# Patient Record
Sex: FEMALE | Race: WHITE
[De-identification: names, ages, dates, MRNs, and addresses within clinical notes are randomized per-mention and may not be internally consistent; named-entity substitution may affect disease eponyms.]

---

## 2017-01-03 ENCOUNTER — HOSPITAL ENCOUNTER (INPATIENT)
Dept: HOSPITAL 47 - EC | Age: 62
LOS: 3 days | Discharge: HOME | DRG: 287 | End: 2017-01-06
Payer: COMMERCIAL

## 2017-01-03 VITALS — BODY MASS INDEX: 38.7 KG/M2

## 2017-01-03 DIAGNOSIS — M19.90: ICD-10-CM

## 2017-01-03 DIAGNOSIS — E66.9: ICD-10-CM

## 2017-01-03 DIAGNOSIS — E78.00: ICD-10-CM

## 2017-01-03 DIAGNOSIS — Z96.612: ICD-10-CM

## 2017-01-03 DIAGNOSIS — E78.5: ICD-10-CM

## 2017-01-03 DIAGNOSIS — Z79.899: ICD-10-CM

## 2017-01-03 DIAGNOSIS — Z87.891: ICD-10-CM

## 2017-01-03 DIAGNOSIS — E03.9: ICD-10-CM

## 2017-01-03 DIAGNOSIS — Z96.611: ICD-10-CM

## 2017-01-03 DIAGNOSIS — I11.0: ICD-10-CM

## 2017-01-03 DIAGNOSIS — F32.9: ICD-10-CM

## 2017-01-03 DIAGNOSIS — I50.22: ICD-10-CM

## 2017-01-03 DIAGNOSIS — M79.7: ICD-10-CM

## 2017-01-03 DIAGNOSIS — I20.1: ICD-10-CM

## 2017-01-03 DIAGNOSIS — I25.110: Primary | ICD-10-CM

## 2017-01-03 DIAGNOSIS — Z96.653: ICD-10-CM

## 2017-01-03 DIAGNOSIS — Z88.5: ICD-10-CM

## 2017-01-03 DIAGNOSIS — Z85.820: ICD-10-CM

## 2017-01-03 DIAGNOSIS — I42.9: ICD-10-CM

## 2017-01-03 DIAGNOSIS — I49.3: ICD-10-CM

## 2017-01-03 LAB
ALP SERPL-CCNC: 96 U/L (ref 38–126)
ALT SERPL-CCNC: 29 U/L (ref 9–52)
ANION GAP SERPL CALC-SCNC: 16 MMOL/L
APTT BLD: 24.3 SEC (ref 22–30)
AST SERPL-CCNC: 26 U/L (ref 14–36)
BASOPHILS # BLD AUTO: 0 K/UL (ref 0–0.2)
BASOPHILS NFR BLD AUTO: 1 %
BUN SERPL-SCNC: 15 MG/DL (ref 7–17)
CALCIUM SPEC-MCNC: 9.6 MG/DL (ref 8.4–10.2)
CH: 29.3
CHCM: 33.9
CHLORIDE SERPL-SCNC: 105 MMOL/L (ref 98–107)
CK SERPL-CCNC: 42 U/L (ref 30–135)
CK SERPL-CCNC: 46 U/L (ref 30–135)
CO2 SERPL-SCNC: 23 MMOL/L (ref 22–30)
EOSINOPHIL # BLD AUTO: 0.1 K/UL (ref 0–0.7)
EOSINOPHIL NFR BLD AUTO: 2 %
ERYTHROCYTE [DISTWIDTH] IN BLOOD BY AUTOMATED COUNT: 5.16 M/UL (ref 3.8–5.4)
ERYTHROCYTE [DISTWIDTH] IN BLOOD: 12.8 % (ref 11.5–15.5)
GLUCOSE SERPL-MCNC: 107 MG/DL (ref 74–99)
HCT VFR BLD AUTO: 44.7 % (ref 34–46)
HDW: 2.26
HGB BLD-MCNC: 14.8 GM/DL (ref 11.4–16)
INR PPP: 1 (ref ?–1.1)
LUC NFR BLD AUTO: 1 %
LYMPHOCYTES # SPEC AUTO: 1.3 K/UL (ref 1–4.8)
LYMPHOCYTES NFR SPEC AUTO: 21 %
MAGNESIUM SPEC-SCNC: 1.8 MG/DL (ref 1.6–2.3)
MCH RBC QN AUTO: 28.6 PG (ref 25–35)
MCHC RBC AUTO-ENTMCNC: 33 G/DL (ref 31–37)
MCV RBC AUTO: 86.7 FL (ref 80–100)
MONOCYTES # BLD AUTO: 0.5 K/UL (ref 0–1)
MONOCYTES NFR BLD AUTO: 8 %
NEUTROPHILS # BLD AUTO: 4.3 K/UL (ref 1.3–7.7)
NEUTROPHILS NFR BLD AUTO: 67 %
NON-AFRICAN AMERICAN GFR(MDRD): >60
POTASSIUM SERPL-SCNC: 4.5 MMOL/L (ref 3.5–5.1)
PROT SERPL-MCNC: 7.7 G/DL (ref 6.3–8.2)
PT BLD: 10.5 SEC (ref 9–12)
SODIUM SERPL-SCNC: 144 MMOL/L (ref 137–145)
TROPONIN I SERPL-MCNC: <0.012 NG/ML (ref 0–0.03)
TROPONIN I SERPL-MCNC: <0.012 NG/ML (ref 0–0.03)
WBC # BLD AUTO: 0.08 10*3/UL
WBC # BLD AUTO: 6.4 K/UL (ref 3.8–10.6)
WBC (PEROX): 6.18

## 2017-01-03 PROCEDURE — 96365 THER/PROPH/DIAG IV INF INIT: CPT

## 2017-01-03 PROCEDURE — 93017 CV STRESS TEST TRACING ONLY: CPT

## 2017-01-03 PROCEDURE — 78452 HT MUSCLE IMAGE SPECT MULT: CPT

## 2017-01-03 PROCEDURE — 82553 CREATINE MB FRACTION: CPT

## 2017-01-03 PROCEDURE — 36415 COLL VENOUS BLD VENIPUNCTURE: CPT

## 2017-01-03 PROCEDURE — 80061 LIPID PANEL: CPT

## 2017-01-03 PROCEDURE — 85610 PROTHROMBIN TIME: CPT

## 2017-01-03 PROCEDURE — 80048 BASIC METABOLIC PNL TOTAL CA: CPT

## 2017-01-03 PROCEDURE — 82550 ASSAY OF CK (CPK): CPT

## 2017-01-03 PROCEDURE — 80053 COMPREHEN METABOLIC PANEL: CPT

## 2017-01-03 PROCEDURE — 99291 CRITICAL CARE FIRST HOUR: CPT

## 2017-01-03 PROCEDURE — 93306 TTE W/DOPPLER COMPLETE: CPT

## 2017-01-03 PROCEDURE — 96361 HYDRATE IV INFUSION ADD-ON: CPT

## 2017-01-03 PROCEDURE — 93005 ELECTROCARDIOGRAM TRACING: CPT

## 2017-01-03 PROCEDURE — 83735 ASSAY OF MAGNESIUM: CPT

## 2017-01-03 PROCEDURE — 93458 L HRT ARTERY/VENTRICLE ANGIO: CPT

## 2017-01-03 PROCEDURE — 71020: CPT

## 2017-01-03 PROCEDURE — 85730 THROMBOPLASTIN TIME PARTIAL: CPT

## 2017-01-03 PROCEDURE — 85025 COMPLETE CBC W/AUTO DIFF WBC: CPT

## 2017-01-03 PROCEDURE — 84484 ASSAY OF TROPONIN QUANT: CPT

## 2017-01-03 PROCEDURE — 96376 TX/PRO/DX INJ SAME DRUG ADON: CPT

## 2017-01-03 RX ADMIN — NITROGLYCERIN PRN MG: 0.4 TABLET SUBLINGUAL at 20:52

## 2017-01-03 RX ADMIN — NITROGLYCERIN PRN MG: 0.4 TABLET SUBLINGUAL at 20:41

## 2017-01-03 RX ADMIN — CYCLOBENZAPRINE HYDROCHLORIDE PRN MG: 5 TABLET, FILM COATED ORAL at 23:34

## 2017-01-03 RX ADMIN — NITROGLYCERIN SCH: 20 OINTMENT TOPICAL at 23:32

## 2017-01-03 RX ADMIN — HYDROCODONE BITARTRATE AND ACETAMINOPHEN PRN EACH: 10; 325 TABLET ORAL at 22:53

## 2017-01-03 RX ADMIN — NITROGLYCERIN PRN MG: 0.4 TABLET SUBLINGUAL at 20:46

## 2017-01-03 NOTE — XR
EXAMINATION TYPE: XR chest 2V

 

DATE OF EXAM: 1/3/2017 4:30 PM

 

COMPARISON: NONE

 

INDICATION: Chest pain

 

TECHNIQUE:  Frontal and lateral views of the chest are obtained.

 

FINDINGS:  

The heart size is normal.

The pulmonary vasculature is normal.

The lungs are clear.

 

IMPRESSION:  

1. No acute pulmonary process.

## 2017-01-03 NOTE — ED
General Adult HPI





- General


Chief complaint: Chest Pain


Stated complaint: Chest Pressure


Time Seen by Provider: 01/03/17 14:50


Source: patient, RN notes reviewed


Mode of arrival: wheelchair


Limitations: physical limitation





- History of Present Illness


Initial comments: 





This is a 61-year-old female with a past medical history significant for 

possible syndrome and high blood pressure.  Patient states she has borderline 

high cholesterol.  Patient comes in today because she 6.  One hour of chest 

tightness.  Patient states became very diaphoretic.  In the tightness 

continues.  Patient states she's mildly short of breath.  Patient denies any 

nausea.  Patient denies any palpitations.  Patient denies lightheadedness 

dizziness or near syncopal episode per patient denies headache patient denies 

numbness weakness.  Patient denies abdominal pain patient denies nausea 

vomiting or diarrhea.  Patient states she was catheterized about 15 years ago 

but has not had any Physician since.





- Related Data


 Home Medications











 Medication  Instructions  Recorded  Confirmed


 


ALPRAZolam [Xanax] 0.25 mg PO BID PRN 05/02/16 01/03/17


 


Albuterol Inhaler [Ventolin Hfa 2 puff INHALATION RT-Q6H PRN 05/02/16 01/03/17





Inhaler]   


 


Cyclobenzaprine [Flexeril] 5 mg PO TID PRN 05/02/16 01/03/17


 


Levothyroxine Sodium [Synthroid] 88 mcg PO DAILY 05/02/16 01/03/17


 


Nabumetone [Relafen] 1,500 mg PO QAM 05/02/16 01/03/17


 


Omeprazole [PriLOSEC] 40 mg PO DAILY 05/02/16 01/03/17


 


Liothyronine Sodium [Cytomel] 10 mcg PO DAILY 08/31/16 01/03/17


 


Multivitamins, Thera [Multivitamin] 1 tab PO DAILY 08/31/16 01/03/17


 


Sertraline [Zoloft] 150 mg PO DAILY 08/31/16 01/03/17


 


Rizatriptan Odt [Maxalt Mlt] 10 mg PO DAILY PRN 09/12/16 01/03/17


 


Lisinopril [Zestril] 10 mg PO DAILY 12/14/16 01/03/17


 


Milk Thistle 150 mg PO DAILY 12/14/16 01/03/17


 


HYDROcodone/APAP 10-325MG [Norco 1 - 2 tab PO Q6H PRN 01/03/17 01/03/17





10]   











 Allergies











Allergy/AdvReac Type Severity Reaction Status Date / Time


 


codeine Allergy  Nausea & Verified 01/03/17 15:38





   Vomiting  














Review of Systems


ROS Statement: 


Those systems with pertinent positive or pertinent negative responses have been 

documented in the HPI.





ROS Other: All systems not noted in ROS Statement are negative.





Past Medical History


Past Medical History: Cancer, Hyperlipidemia, Hypertension, Osteoarthritis (OA)

, Thyroid Disorder


Additional Past Medical History / Comment(s): POTS,MTHFR mutation, hx melanoma


History of Any Multi-Drug Resistant Organisms: None Reported


Past Surgical History: Heart Catheterization, Hysterectomy, Joint Replacement, 

Orthopedic Surgery


Additional Past Surgical History / Comment(s): bilateral knee replacement, and 

bilateral shoulders, joint rt thumb replaced and revised


Past Anesthesia/Blood Transfusion Reactions: Previous Problems w/ Anesthesia, 

Postoperative Nausea & Vomiting (PONV)


Additional Past Anesthesia/Blood Transfusion Reaction / Comment(s): POTS 

episode during anesthesia (severe drop in BP after previous knee replacement 

surgery)


Past Psychological History: No Psychological Hx Reported


Smoking Status: Former smoker


Past Alcohol Use History: Occasional


Additional Past Alcohol Use History / Comment(s): quit smoking couple years 

ago. smoked for 15 days < 1/2 PPD


Past Drug Use History: None Reported





- Past Family History


  ** Sister(s)


Family Medical History: Cancer





  ** Brother(s)


Family Medical History: Cancer





General Exam





- General Exam Comments


Initial Comments: 





GENERAL:


Patient is well-developed and well-nourished.  Patient is nontoxic and well-

hydrated and is in mild distress.





ENT:


Neck is soft and supple.  No significant lymphadenopathy is noted.  Oropharynx 

is clear.  Moist mucous membranes.  Neck has full range of motion without 

eliciting any pain.  





EYES:


The sclera were anicteric and conjunctiva were pink and moist.  Extraocular 

movements were intact and pupils were equal round and reactive to light.  

Eyelids were unremarkable.





PULMONARY:


Unlabored respirations.  Good breath sounds bilaterally.  No audible rales 

rhonchi or wheezing was noted.





CARDIOVASCULAR:


There is a regular rate and rhythm without any murmurs gallops or rubs. 





ABDOMEN:


Soft and nontender with normal bowel sounds.  No palpable organomegaly was 

noted.  There is no palpable pulsatile mass.





SKIN:


Skin is clear with no lesions or rashes and otherwise unremarkable.





NEUROLOGIC:


Patient is alert and oriented x3.  Cranial nerves II through XII are grossly 

intact.  Motor and sensory are also intact.  Normal speech, volume and content.

  Symmetrical smile.  





MUSCULOSKELETAL:


Normal extremities with adequate strength and full range of motion.  





LYMPHATICS:


No significant lymphadenopathy is noted





PSYCHIATRIC:


Normal psychiatric evaluation.  Normal interpersonal interactions appears 

functionally intact in deals appropriately with others.  No signs of 

depression.  No signs of anxiety. 


Limitations: physical limitation





Course


 Vital Signs











  01/03/17 01/03/17





  15:02 16:28


 


Temperature 96.9 F L 


 


Pulse Rate 96 99


 


Respiratory 18 18





Rate  


 


Blood Pressure 150/94 126/83


 


O2 Sat by Pulse 100 97





Oximetry  














Medical Decision Making





- Medical Decision Making





Patient's EKG shows bigeminy.  EKG shows sinus rhythm at 102 bpm.  It was 162 

QRS is 94 QT interval 362 QTC is 471.  Intrinsic rhythm was compared to an old 

EKG no new EKG changes are noted aside from the bigeminy





Chest x-ray shows no acute abnormality.  I went back and reevaluated the 

patient she was no longer in bigeminy and she was feeling considerably better 

because of patient's initial symptoms.  I started the patient on heparin 

because of his symptoms I spoke with Dr. Shelby admitted patient's to Dr. Shelby and I wrote admitting orders and consult cardiology.





- Lab Data


Result diagrams: 


 01/03/17 15:15





 01/03/17 15:15


 Lab Results











  01/03/17 01/03/17 01/03/17 Range/Units





  15:15 15:15 15:15 


 


WBC   6.4   (3.8-10.6)  k/uL


 


RBC   5.16   (3.80-5.40)  m/uL


 


Hgb   14.8   (11.4-16.0)  gm/dL


 


Hct   44.7   (34.0-46.0)  %


 


MCV   86.7   (80.0-100.0)  fL


 


MCH   28.6   (25.0-35.0)  pg


 


MCHC   33.0   (31.0-37.0)  g/dL


 


RDW   12.8   (11.5-15.5)  %


 


Plt Count   332   (150-450)  k/uL


 


Neutrophils %   67   %


 


Lymphocytes %   21   %


 


Monocytes %   8   %


 


Eosinophils %   2   %


 


Basophils %   1   %


 


Neutrophils #   4.3   (1.3-7.7)  k/uL


 


Lymphocytes #   1.3   (1.0-4.8)  k/uL


 


Monocytes #   0.5   (0-1.0)  k/uL


 


Eosinophils #   0.1   (0-0.7)  k/uL


 


Basophils #   0.0   (0-0.2)  k/uL


 


PT     (9.0-12.0)  sec


 


INR     (<1.1)  


 


APTT     (22.0-30.0)  sec


 


Sodium    144  (137-145)  mmol/L


 


Potassium    4.5  (3.5-5.1)  mmol/L


 


Chloride    105  ()  mmol/L


 


Carbon Dioxide    23  (22-30)  mmol/L


 


Anion Gap    16  mmol/L


 


BUN    15  (7-17)  mg/dL


 


Creatinine    0.79  (0.52-1.04)  mg/dL


 


Est GFR (MDRD) Af Amer    >60  (>60 ml/min/1.73 sqM)  


 


Est GFR (MDRD) Non-Af    >60  (>60 ml/min/1.73 sqM)  


 


Glucose    107 H  (74-99)  mg/dL


 


Calcium    9.6  (8.4-10.2)  mg/dL


 


Magnesium    1.8  (1.6-2.3)  mg/dL


 


Total Bilirubin    0.7  (0.2-1.3)  mg/dL


 


AST    26  (14-36)  U/L


 


ALT    29  (9-52)  U/L


 


Alkaline Phosphatase    96  ()  U/L


 


Total Creatine Kinase  46    ()  U/L


 


CK-MB (CK-2)  0.5    (0.0-2.4)  ng/mL


 


CK-MB (CK-2) Rel Index  1.1    


 


Troponin I  <0.012    (0.000-0.034)  ng/mL


 


Total Protein    7.7  (6.3-8.2)  g/dL


 


Albumin    4.7  (3.5-5.0)  g/dL














  01/03/17 Range/Units





  15:15 


 


WBC   (3.8-10.6)  k/uL


 


RBC   (3.80-5.40)  m/uL


 


Hgb   (11.4-16.0)  gm/dL


 


Hct   (34.0-46.0)  %


 


MCV   (80.0-100.0)  fL


 


MCH   (25.0-35.0)  pg


 


MCHC   (31.0-37.0)  g/dL


 


RDW   (11.5-15.5)  %


 


Plt Count   (150-450)  k/uL


 


Neutrophils %   %


 


Lymphocytes %   %


 


Monocytes %   %


 


Eosinophils %   %


 


Basophils %   %


 


Neutrophils #   (1.3-7.7)  k/uL


 


Lymphocytes #   (1.0-4.8)  k/uL


 


Monocytes #   (0-1.0)  k/uL


 


Eosinophils #   (0-0.7)  k/uL


 


Basophils #   (0-0.2)  k/uL


 


PT  10.5  (9.0-12.0)  sec


 


INR  1.0  (<1.1)  


 


APTT  24.3  (22.0-30.0)  sec


 


Sodium   (137-145)  mmol/L


 


Potassium   (3.5-5.1)  mmol/L


 


Chloride   ()  mmol/L


 


Carbon Dioxide   (22-30)  mmol/L


 


Anion Gap   mmol/L


 


BUN   (7-17)  mg/dL


 


Creatinine   (0.52-1.04)  mg/dL


 


Est GFR (MDRD) Af Amer   (>60 ml/min/1.73 sqM)  


 


Est GFR (MDRD) Non-Af   (>60 ml/min/1.73 sqM)  


 


Glucose   (74-99)  mg/dL


 


Calcium   (8.4-10.2)  mg/dL


 


Magnesium   (1.6-2.3)  mg/dL


 


Total Bilirubin   (0.2-1.3)  mg/dL


 


AST   (14-36)  U/L


 


ALT   (9-52)  U/L


 


Alkaline Phosphatase   ()  U/L


 


Total Creatine Kinase   ()  U/L


 


CK-MB (CK-2)   (0.0-2.4)  ng/mL


 


CK-MB (CK-2) Rel Index   


 


Troponin I   (0.000-0.034)  ng/mL


 


Total Protein   (6.3-8.2)  g/dL


 


Albumin   (3.5-5.0)  g/dL














Critical Care Time


Critical Care Time: Yes


Total Critical Care Time: 35





Disposition


Clinical Impression: 


 Unstable angina pectoris, Bigeminy





Disposition: ADMITTED AS IP TO THIS [facility]


Time of Disposition: 16:56

## 2017-01-04 LAB
CHOLEST SERPL-MCNC: 228 MG/DL (ref ?–200)
CK SERPL-CCNC: 40 U/L (ref 30–135)
HDLC SERPL-MCNC: 49 MG/DL (ref 40–60)
TRIGL SERPL-MCNC: 90 MG/DL (ref ?–150)
TROPONIN I SERPL-MCNC: <0.012 NG/ML (ref 0–0.03)

## 2017-01-04 RX ADMIN — THERA TABS SCH EACH: TAB at 08:16

## 2017-01-04 RX ADMIN — LEVOTHYROXINE SODIUM SCH MCG: 75 TABLET ORAL at 07:53

## 2017-01-04 RX ADMIN — CYCLOBENZAPRINE HYDROCHLORIDE PRN MG: 5 TABLET, FILM COATED ORAL at 20:55

## 2017-01-04 RX ADMIN — SERTRALINE HYDROCHLORIDE SCH MG: 100 TABLET ORAL at 08:15

## 2017-01-04 RX ADMIN — ASPIRIN 325 MG ORAL TABLET SCH MG: 325 PILL ORAL at 08:15

## 2017-01-04 RX ADMIN — LISINOPRIL SCH MG: 10 TABLET ORAL at 08:15

## 2017-01-04 RX ADMIN — HYDROCODONE BITARTRATE AND ACETAMINOPHEN PRN EACH: 10; 325 TABLET ORAL at 20:14

## 2017-01-04 RX ADMIN — PANTOPRAZOLE SODIUM SCH MG: 40 TABLET, DELAYED RELEASE ORAL at 07:53

## 2017-01-04 RX ADMIN — LIOTHYRONINE SODIUM SCH MCG: 5 TABLET ORAL at 07:53

## 2017-01-04 RX ADMIN — NITROGLYCERIN SCH: 20 OINTMENT TOPICAL at 06:19

## 2017-01-04 RX ADMIN — MELOXICAM SCH MG: 7.5 TABLET ORAL at 08:15

## 2017-01-04 RX ADMIN — HYDROCODONE BITARTRATE AND ACETAMINOPHEN PRN EACH: 10; 325 TABLET ORAL at 08:14

## 2017-01-04 RX ADMIN — NITROGLYCERIN SCH: 20 OINTMENT TOPICAL at 07:53

## 2017-01-04 NOTE — ECHOF
Referral Reason:chest pain



MEASUREMENTS

--------

HEIGHT: 162.6 cm

WEIGHT: 102.1 kg

BP: 123/65

RVIDd:   2.4 cm     (< 3.3)

IVSd:   1.1 cm     (0.6 - 1.1)

LVIDd:   5.1 cm     (3.9 - 5.3)

LVPWd:   1.0 cm     (0.6 - 1.1)

IVSs:   1.4 cm

LVIDs:   3.2 cm

LVPWs:   1.8 cm

LA Diam:   3.3 cm     (2.7 - 3.8)

Ao Diam:   3.0 cm     (2.0 - 3.7)

AV Cusp:   2.0 cm     (1.5 - 2.6)

LA Diam:   2.8 cm     (2.7 - 3.8)

MV EXCURSION:   14.642 mm     (> 18.000)

MV EF SLOPE:   31 mm/s     (70 - 150)

EPSS:   1.2 cm

MV E Efrain:   0.98 m/s

MV DecT:   207 ms

MV A Efrain:   1.03 m/s

MV E/A Ratio:   0.96 

RAP:   5.00 mmHg

RVSP:   21.81 mmHg







FINDINGS

--------

Sinus rhythm.

This was a technically good study.

Left ventricular wall thickness is normal.   Overall 

left ventricular systolic function is low-normal with, 

an EF between 50 - 55 %.

The right ventricle is normal in size.

The left atrial size is normal.

The right atrium is normal in size.

Aortic valve is trileaflet and is mildly thickened.

The mitral valve leaflets are mildly thickened.   Mild 

mitral annular calcification present.   Mild mitral 

regurgitation is present.

Mild tricuspid regurgitation present.   The right 

ventricular systolic pressure, as measured by Doppler, 

is 21.81mmHg.

Trace/mild (physiologic)  pulmonic regurgitation.

The aortic root size is normal.

Echo free space may represent effusion or a pericardial 

fat pad.



CONCLUSIONS

--------

1. Sinus rhythm.

2. Mild mitral annular calcification present.

3. Mild mitral regurgitation is present.

4. Mild tricuspid regurgitation present.

5. The right ventricular systolic pressure, as measured by 

Doppler, is 21.81mmHg.

6. Trace/mild (physiologic)  pulmonic regurgitation.

7. The aortic root size is normal.

8. Echo free space may represent effusion or a pericardial 

fat pad.

9. This was a technically good study.

10. Left ventricular wall thickness is normal.

11. Overall left ventricular systolic function is 

low-normal with, an EF between 50 - 55 %.

12. The right ventricle is normal in size.

13. The left atrial size is normal.

14. The right atrium is normal in size.

15. Aortic valve is trileaflet and is mildly thickened.

16. The mitral valve leaflets are mildly thickened.





SONOGRAPHER: Mona Bates RDCS

## 2017-01-04 NOTE — HP
DATE OF ADMISSION:  



DATE OF SERVICE:  01/03/2017



The chief complaint is chest pain.



HISTORY OF PRESENT ILLNESS:  This is a 61-year-old woman with a past 

medical history of Pots syndrome, fibromyalgia, hypertension, 

hyperlipidemia, history of hypothyroidism, history of MTHFR mutation, 

history of melanoma, back surgery, history of cardiac catheterization, 

being followed by Dr. Baez in the outpatient setting, is complaining 

of chest pain. The patient had chest tightness and patient becoming 

diaphoretic and patient also has mild shortness of breath. The pain 

was felt in the anterior part of the chest.  Patient apparently had a 

stress test about 3 - 4 years ago, because of increasing pain, patient 

came to [facility], admitting for further evaluation and 

treatment.  The admission EKG did not show any acute abnormality. 

Troponins are negative so far.  There is no history of fever, rigors 

or chills.  No history of headache, loss of consciousness or seizures 

at this time. Admission EKG showed left axis deviation and as well as 

multiple PVCs.  



Past medical history of Pots syndrome, fibromyalgia, hypertension, 

hyperlipidemia, hypothyroidism.  

History of MTHFR mutation, history of cardiac catheterization, history 

of depression, history of nicotine dependence.  



Medications prior to admission are:

1. Synthroid 75 mcg p.o. daily. 

2. Norco 10 mg q.6 p.r.n. 

3. Flexeril 5 mg,

4. Ventolin HFA 2 puffs q.6 p.r.n. 

5. Xanax 0.5 b.i.d. p.r.n.

6. Multivitamin 1 p.o. daily.

7. Milk Thistle 150 mg p.o. daily.

8. Zestril 10 mg p.o. daily. 

9. Cytomel 10 mcg p.o. daily. 

10. Zoloft 150 mg p.o. daily. 

11. Maxalt 10 mg daily p.r.n. 

12. Prilosec 40 mg daily. 

13. Relafen 1500 mg q.a.m. 



Allergies are CODEINE. 



FAMILY HISTORY: History of cancer in the family. 



SOCIAL HISTORY:  The patient is an LPN with  occasional 

alcohol and previous smoking.  



REVIEW OF SYSTEMS: 

ENT: No diminished hearing or diminished vision.

CARDIOVASCULAR SYSTEM:  As mentioned earlier. 

RESPIRATORY: As mentioned earlier. 

GI:  No nausea.

:  No dysuria.

NERVOUS SYSTEM: No numbness or weakness.

ALLERGY/IMMUNOLOGY:  As mentioned earlier. 

MUSCULOSKELETAL:  As mentioned earlier. 

RHEUMATOLOGY:  As mentioned earlier. 

ENDOCRINE:  As mentioned earlier.

CONSTITUTIONAL:  As mentioned earlier. 

DERMATOLOGY:  Negative.

PSYCHIATRY:  As mentioned earlier.



PHYSICAL EXAM:  

Patient is alert and oriented x3.  Pulse 91, blood pressure 126/73, 

respirations 18, temperature is 97 degrees, pulse ox 99% on room air.  

HEENT:  Conjunctivae normal, oral mucosa moist.  

NECK: No jugular venous distension, no carotid bruit, no lymph node 

enlargement. 

CARDIOVASCULAR SYSTEM: S1, S2, muffled. 

RESPIRATORY:  Breath sounds diminished at the bases. No rhonchi, no 

crackles.  

ABDOMEN: Soft, nontender, no mass palpable. 

EXTREMITIES:  No edema, no swelling. 

NERVOUS SYSTEM: Higher functions as mentioned earlier. Moves all 4 

limbs. No focal motor deficits. 

LYMPHATICS:  No lymph node enlargement in the neck, groin or axillae. 

SKIN: No ulcer, rash or bleeding. 



LABS: CBC within normal limits.  Glucose 107, aPTT 80.5.  



ASSESSMENT:

1. Chest pain, possible unstable angina. 

2. Heparin monitoring. 

3. Fibromyalgia.

4. Pots syndrome. 

5. Hypertension. 

6. Hyperlipidemia. 

7. History of degenerative joint disease. 

8. History of MTHFR mutation. 

9. History of hypothyroidism.

10. History of back surgery.

11. Degenerative joint disease.

12. History of cardiac catheterization. 

13. History of depression. 

14. Remote history of nicotine dependence. 

15. Obesity, body mass index of 38.7. 



RECOMMENDATION:  In this 61-year-old woman who presented with multiple 

complex medical issues, will monitor the patient closely.  Continue 

with the monitoring and symptomatic treatment and acs protocol. Rule 

out acute coronary artery syndrome. N.p.o. past midnight. Cardiology 

consultation. Otherwise, repeat labs in the morning, resume the home 

medications.  Guarded prognosis because of multiple complex medical 

issues. Further recommendations to follow.  A copy of this will be 

forwarded Dr. Baez who is the primary physician.  



MARY

## 2017-01-04 NOTE — NM
EXAMINATION TYPE: NM stress lexiscan cardiolite

 

DATE OF EXAM: 1/4/2017 1:14 PM

 

COMPARISON: NONE

 

HISTORY: Chest pain

 

TECHNIQUE:  After the intravenous administration of 10.08 mCi Tc 99m Sestamibi - Cardiolite resting S
PECT images acquired 45 minutes post injection. 

 

The patient received 0.4mg Lexiscan, 25.4 mCi Tc 99m Sestamibi - Stress images obtained 30 minutes po
st injection 

 

FINDINGS:  

 

Review of stress and rest SPECT images demonstrates stress-induced reversibility involving the apex a
nd apical anterior portion of the myocardium. Gated analysis shows reduced wall motion activity with 
an estimated left ventricular ejection fraction of 24% %.

 

 

 

IMPRESSION:  

 

Abnormal ejection fraction 24% with findings suggestive of stress-induced reversible ischemia involvi
ng the apex and apical anterior myocardium

## 2017-01-04 NOTE — P.CRDCN
History of Present Illness


Consult date: 01/04/17


Requesting physician: Antoine Shelby


Consult reason: chest pain


Chief complaint: Chest pain


History of present illness: 


This is a 61-year-old  female with documented history of Sepulveda, 

hypertension, hyperlipidemia, untreated, no diabetes, she is a nonsmoker, 

history of arthritis with multiple joint replacements, hypothyroidism.  She 

presents to the hospital with symptoms of midsternal chest pressure with 

radiation through to the shoulder blades, positive associated shortness of 

breath and diaphoresis.  She states that she initially thought these were just 

her symptoms which she experiences from Sepulveda, however this was much different, 

according to her the symptoms lasted approximately an hour in duration.  She 

did have one more episode through the night last night, relieved with 

nitroglycerin.  According to the patient she did undergo a cardiac 

catheterization approximately 15 years ago which was reported to be normal.  

Chest x-ray on admission here did not reveal any acute cardiopulmonary process.

  Initial EKG on arrival here showed a normal sinus rhythm with bigeminal PVCs.

  No acute changes noted.  Repeat EKG performed last evening showed a normal 

sinus rhythm with occasional PVC, no acute changes noted.  Lab data was reviewed

, CBC normal, potassium 4.5, BUN 15, creatinine 0.7.  Magnesium level I.8.  

Troponins have been negative 3.  Cholesterol 228, , HDL 49, 

triglycerides 90.  Blood pressure this morning 122/64, heart rate in the 80s.  

98% on room air.  At the time of my examination this morning, patient complains 

of nausea, denies any chest pain at present.








Past Medical History


Past Medical History: Cancer, Fibromyalgia, Hyperlipidemia, Hypertension, 

Osteoarthritis (OA), Thyroid Disorder


Additional Past Medical History / Comment(s): POTS,MTHFR mutation, hx melanoma


History of Any Multi-Drug Resistant Organisms: None Reported


Past Surgical History: Back Surgery, Heart Catheterization, Hysterectomy, Joint 

Replacement, Orthopedic Surgery


Additional Past Surgical History / Comment(s): bilateral knee replacement, and 

bilateral shoulders, joint rt thumb replaced and revised.  Heart cath 15 years 

ago with no stents. Right knee revision in 2016. Back surgery 5 years ago.


Past Anesthesia/Blood Transfusion Reactions: Previous Problems w/ Anesthesia, 

Postoperative Nausea & Vomiting (PONV)


Additional Past Anesthesia/Blood Transfusion Reaction / Comment(s): POTS 

episode during anesthesia (severe drop in BP after previous knee replacement 

surgery)


Past Psychological History: Depression


Smoking Status: Former smoker


Past Alcohol Use History: Occasional


Additional Past Alcohol Use History / Comment(s): quit smoking couple years 

ago. Smoke off and on for 30 years < 1/2 PPD


Past Drug Use History: None Reported





- Past Family History


  ** Sister(s)


Family Medical History: Cancer





  ** Brother(s)


Family Medical History: Cancer





Medications and Allergies


 Home Medications











 Medication  Instructions  Recorded  Confirmed  Type


 


ALPRAZolam [Xanax] 0.25 mg PO BID PRN 05/02/16 01/03/17 History


 


Albuterol Inhaler [Ventolin Hfa 2 puff INHALATION RT-Q6H PRN 05/02/16 01/03/17 

History





Inhaler]    


 


Cyclobenzaprine [Flexeril] 5 mg PO TID PRN 05/02/16 01/03/17 History


 


Levothyroxine Sodium [Synthroid] 75 mcg PO DAILY 05/02/16 01/03/17 History


 


Nabumetone [Relafen] 1,500 mg PO QAM 05/02/16 01/03/17 History


 


Omeprazole [PriLOSEC] 40 mg PO DAILY 05/02/16 01/03/17 History


 


Liothyronine Sodium [Cytomel] 10 mcg PO DAILY 08/31/16 01/03/17 History


 


Multivitamins, Thera [Multivitamin] 1 tab PO DAILY 08/31/16 01/03/17 History


 


Sertraline [Zoloft] 150 mg PO DAILY 08/31/16 01/03/17 History


 


Rizatriptan Odt [Maxalt Mlt] 10 mg PO DAILY PRN 09/12/16 01/03/17 History


 


Lisinopril [Zestril] 10 mg PO DAILY 12/14/16 01/03/17 History


 


Milk Thistle 150 mg PO DAILY 12/14/16 01/03/17 History


 


HYDROcodone/APAP 10-325MG [Norco 1 - 2 tab PO Q6H PRN 01/03/17 01/03/17 History





10]    











 Allergies











Allergy/AdvReac Type Severity Reaction Status Date / Time


 


codeine Allergy  Nausea & Verified 01/03/17 15:38





   Vomiting  














Physical Exam


Vitals: 


 Vital Signs











  Temp Pulse Pulse Resp BP BP Pulse Ox


 


 01/04/17 08:00  97.3 F L   87  18   123/65  98


 


 01/04/17 04:00  97.0 F L   89  18   107/67  99


 


 01/04/17 00:00  96.8 F L   76  18   117/75  98


 


 01/03/17 20:56  97.0 F L   91  18   126/76  99


 


 01/03/17 20:52       141/75 


 


 01/03/17 20:47       127/79 


 


 01/03/17 20:41       145/64 


 


 01/03/17 20:00  97.0 F L   86  18   134/78  99


 


 01/03/17 19:45  97.0 F L   86  18   134/78  99


 


 01/03/17 19:14   84   18  135/64   95


 


 01/03/17 17:30   80   16  125/93   97








 Intake and Output











 01/03/17 01/04/17 01/04/17





 22:59 06:59 14:59


 


Intake Total  392.667 0


 


Output Total 100  


 


Balance -100 392.667 0


 


Intake:   


 


  Intake, IV Titration  92.667 





  Amount   


 


    Heparin Sodium,Porcine/  92.667 





    D5w Pmx 25,000 unit In   





    Dextrose/Water 1 500ml.   





    bag @ 10.022 UNITS/KG/HR   





    20 mls/hr IV .Q24H [facility]   





    #:[id number]   


 


  Oral  300 0


 


Output:   


 


  Urine 100  


 


Other:   


 


  Voiding Method Toilet Toilet 


 


  # Voids  1 


 


  Weight 102.2 kg 102.2 kg 














PHYSICAL EXAMINATION: 





HEENT: Head is atraumatic, normocephalic.  Pupils equal, round.  Neck is 

supple.  There is no elevated jugular venous pressure.





HEART EXAMINATION: Heart S1 and S2 with soft systolic murmur is heard.





CHEST EXAMINATION: Lungs are clear to auscultation and precussion. No chest 

wall tenderness is noted on palpation or with deep breathing.





ABDOMEN:  Soft, nontender. Bowel sounds are heard. No organomegaly noted.


 


EXTREMITIES: 2+ peripheral pulses with no evidence of peripheral edema and no 

calf tenderness noted.





NEUROLOGIC patient is awake, alert and oriented -3.


 


.


 








Results





 01/03/17 15:15





 01/03/17 15:15


 Cardiac Enzymes











  01/03/17 01/04/17 Range/Units





  21:37 03:12 


 


CK-MB (CK-2)  0.5  0.5  (0.0-2.4)  ng/mL


 


Troponin I  <0.012  <0.012  (0.000-0.034)  ng/mL








 Coagulation











  01/03/17 Range/Units





  21:37 


 


APTT  80.5 H  (22.0-30.0)  sec








 Lipids











  01/04/17 Range/Units





  03:12 


 


Triglycerides  90  (<150)  mg/dL


 


Cholesterol  228 H  (<200)  mg/dL


 


HDL Cholesterol  49  (40-60)  mg/dL








 Current Medications











Generic Name Dose Route Start Last Admin





  Trade Name Freq  PRN Reason Stop Dose Admin


 


Acetaminophen/Hydrocodone Bitart  2 each  01/03/17 21:24  01/04/17 08:14





  Norco 10  PO   1 each





  Q6H PRN   Administration





  Pain   


 


Albuterol Sulfate  2.5 mg  01/03/17 21:24  





  Ventolin Nebulized  INHALATION   





  RT-Q6H PRN   





  Shortness Of Breath   


 


Alprazolam  0.25 mg  01/03/17 21:24  





  Xanax  PO   





  BID PRN   





  Anxiety   


 


Aspirin  325 mg  01/04/17 09:00  01/04/17 08:15





  Aspirin  PO   325 mg





  DAILY VEE   Administration


 


Cyclobenzaprine HCl  5 mg  01/03/17 21:24  01/03/17 23:34





  Flexeril  PO   5 mg





  TID PRN   Administration





  Muscle Pain   


 


Heparin Sodium/Dextrose 25,000  500 mls @ 20 mls/hr  01/03/17 17:00  01/03/17 23

:22





  unit/ IV Solution  IV   8.03 units/kg/hr





  .Q24H VEE   16.02 mls/hr





  Protocol   Titration





  10.022 UNITS/KG/HR   


 


Levothyroxine Sodium  75 mcg  01/04/17 06:30  01/04/17 07:53





  Synthroid  PO   75 mcg





  0630 VEE   Administration


 


Liothyronine Sodium  10 mcg  01/04/17 06:30  01/04/17 07:53





  Cytomel  PO   10 mcg





  0630 VEE   Administration


 


Lisinopril  10 mg  01/04/17 09:00  01/04/17 08:15





  Zestril  PO   10 mg





  DAILY VEE   Administration


 


Meloxicam  15 mg  01/04/17 09:00  01/04/17 08:15





  Mobic  PO   15 mg





  QAM VEE   Administration


 


Multivitamins  1 each  01/04/17 12:00  01/04/17 08:16





  Theragran  PO   1 each





  1200 VEE   Administration


 


Nitroglycerin  1 inch  01/03/17 18:00  01/04/17 07:53





  Nitro-Bid Oint  TOPICAL   Not Given





  Q6HR VEE   


 


Nitroglycerin  0.4 mg  01/03/17 16:57  01/03/17 20:52





  Nitrostat  SUBLINGUAL   0.4 mg





  Q5M PRN   Administration





  Chest Pain   


 


Pantoprazole Sodium  40 mg  01/04/17 07:30  01/04/17 07:53





  Protonix  PO   40 mg





  AC-BRKFST VEE   Administration


 


Sertraline HCl  150 mg  01/04/17 09:00  01/04/17 08:15





  Zoloft  PO   150 mg





  DAILY VEE   Administration


 


Sumatriptan Succinate  100 mg  01/03/17 21:24  





  Imitrex  PO   





  DAILY PRN   





  Migraine Headache   








 Intake and Output











 01/03/17 01/04/17 01/04/17





 22:59 06:59 14:59


 


Intake Total  392.667 0


 


Output Total 100  


 


Balance -100 392.667 0


 


Intake:   


 


  Intake, IV Titration  92.667 





  Amount   


 


    Heparin Sodium,Porcine/  92.667 





    D5w Pmx 25,000 unit In   





    Dextrose/Water 1 500ml.   





    bag @ 10.022 UNITS/KG/HR   





    20 mls/hr IV .Q24H [facility]   





    #:[id number]   


 


  Oral  300 0


 


Output:   


 


  Urine 100  


 


Other:   


 


  Voiding Method Toilet Toilet 


 


  # Voids  1 


 


  Weight 102.2 kg 102.2 kg 














EKG Interpretations (text)





EKG shows a normal sinus rhythm with bigeminal PVCs, no acute changes noted.





Assessment and Plan


Plan: 


Assessment and plan


#1 chest pain troponins 3 have been negative.  EKG shows normal sinus rhythm 

with no acute changes.


#2 history of Sepulveda


#3 hypertension


#4 hyperlipidemia, untreated.


# 5 hypothyroidism


#6 arthritis with multiple joint replacement surgeries








Plan


We will obtain an echocardiogram with Doppler study.  Discontinue Nitropaste.  

Discontinue IV heparin.  Initiate low-dose statin.  Patient is advised to 

undergo stress testing for more definitive diagnosis.  Recommendations will be 

based on these findings and patient's clinical course.








DNP note has been reviewed, I agree with a documented findings and plan of 

care.  Patient was seen and examined.

## 2017-01-04 NOTE — ECHOS
DATE OF SERVICE: 01/03/2017



AGE:   61Y        SEX:  F        HT:  5'4"    WT:  220 lbs.           

Protocol Dann:  Others:  Stage:  Dur. of Exercise: 



*Heart Rate         Blood Pressure                     

*Rest:         84             Rest: 129/94                            

*                              

*Max. Achieved: 142      Maximum BP:  170/86 

       85% PMHR:    134             

          100% PMHR:  158          



*METS:    





INDICATIONS:  



MEDICATIONS: See list. 



CLINICAL INFORMATION: History of chest pain, hypertension, and history 

of smoking, quit smoking about a year ago.  



Resting ECG shows sinus rhythm, rate of 84 beats per minute, NM 

interval of 0.16, (   ) 0.08.  Normal ST-T waves. Utilizing a standard 

Lexiscan protocol, Lexiscan was given 0.4 mg IV push followed by 

serial EKGs and (    ) symptoms. Patient's heart rate jumped up 153 

beats per minute without any ST segment deviations and peaked at heart 

rate went up to 142 associated with some nausea, PVCs. No ST segment 

deviations indicative of ischemia or chest pain. Patient complained of 

some throat soreness which may be anginal in nature without EKG 

changes. Patient has received (    ) and Zofran.  Cardiology and 

nuclear scintigrams to be reported by radiology department.  



IMPRESSION: 

1. (    ) rhythm is sinus with normal NM intervals and normal ST-T 

waves.    

2. Negative Lexiscan Cardiolite study. 

3. Nuclear scintigrams to follow from radiology department.

## 2017-01-05 VITALS — RESPIRATION RATE: 18 BRPM

## 2017-01-05 LAB
ANION GAP SERPL CALC-SCNC: 12 MMOL/L
BASOPHILS # BLD AUTO: 0 K/UL (ref 0–0.2)
BASOPHILS NFR BLD AUTO: 1 %
BUN SERPL-SCNC: 15 MG/DL (ref 7–17)
CALCIUM SPEC-MCNC: 9 MG/DL (ref 8.4–10.2)
CH: 28.7
CHCM: 32.9
CHLORIDE SERPL-SCNC: 104 MMOL/L (ref 98–107)
CO2 SERPL-SCNC: 29 MMOL/L (ref 22–30)
EOSINOPHIL # BLD AUTO: 0.2 K/UL (ref 0–0.7)
EOSINOPHIL NFR BLD AUTO: 4 %
ERYTHROCYTE [DISTWIDTH] IN BLOOD BY AUTOMATED COUNT: 4.45 M/UL (ref 3.8–5.4)
ERYTHROCYTE [DISTWIDTH] IN BLOOD: 12.9 % (ref 11.5–15.5)
GLUCOSE SERPL-MCNC: 89 MG/DL (ref 74–99)
HCT VFR BLD AUTO: 39 % (ref 34–46)
HDW: 2.19
HGB BLD-MCNC: 12.5 GM/DL (ref 11.4–16)
LUC NFR BLD AUTO: 2 %
LYMPHOCYTES # SPEC AUTO: 1.7 K/UL (ref 1–4.8)
LYMPHOCYTES NFR SPEC AUTO: 37 %
MCH RBC QN AUTO: 28.2 PG (ref 25–35)
MCHC RBC AUTO-ENTMCNC: 32.2 G/DL (ref 31–37)
MCV RBC AUTO: 87.6 FL (ref 80–100)
MONOCYTES # BLD AUTO: 0.4 K/UL (ref 0–1)
MONOCYTES NFR BLD AUTO: 9 %
NEUTROPHILS # BLD AUTO: 2.2 K/UL (ref 1.3–7.7)
NEUTROPHILS NFR BLD AUTO: 47 %
NON-AFRICAN AMERICAN GFR(MDRD): >60
POTASSIUM SERPL-SCNC: 4.5 MMOL/L (ref 3.5–5.1)
SODIUM SERPL-SCNC: 145 MMOL/L (ref 137–145)
WBC # BLD AUTO: 0.11 10*3/UL
WBC # BLD AUTO: 4.6 K/UL (ref 3.8–10.6)
WBC (PEROX): 4.83

## 2017-01-05 PROCEDURE — B2151ZZ FLUOROSCOPY OF LEFT HEART USING LOW OSMOLAR CONTRAST: ICD-10-PCS

## 2017-01-05 PROCEDURE — B2111ZZ FLUOROSCOPY OF MULTIPLE CORONARY ARTERIES USING LOW OSMOLAR CONTRAST: ICD-10-PCS

## 2017-01-05 PROCEDURE — 4A023N7 MEASUREMENT OF CARDIAC SAMPLING AND PRESSURE, LEFT HEART, PERCUTANEOUS APPROACH: ICD-10-PCS

## 2017-01-05 RX ADMIN — HYDROCODONE BITARTRATE AND ACETAMINOPHEN PRN EACH: 10; 325 TABLET ORAL at 17:02

## 2017-01-05 RX ADMIN — CYCLOBENZAPRINE HYDROCHLORIDE PRN MG: 5 TABLET, FILM COATED ORAL at 21:04

## 2017-01-05 RX ADMIN — ASPIRIN 325 MG ORAL TABLET SCH MG: 325 PILL ORAL at 08:27

## 2017-01-05 RX ADMIN — SERTRALINE HYDROCHLORIDE SCH MG: 100 TABLET ORAL at 08:27

## 2017-01-05 RX ADMIN — LISINOPRIL SCH MG: 10 TABLET ORAL at 08:27

## 2017-01-05 RX ADMIN — CARVEDILOL SCH MG: 3.12 TABLET, FILM COATED ORAL at 17:51

## 2017-01-05 RX ADMIN — PANTOPRAZOLE SODIUM SCH MG: 40 TABLET, DELAYED RELEASE ORAL at 08:58

## 2017-01-05 RX ADMIN — HYDROCODONE BITARTRATE AND ACETAMINOPHEN PRN EACH: 10; 325 TABLET ORAL at 23:58

## 2017-01-05 RX ADMIN — THERA TABS SCH: TAB at 12:28

## 2017-01-05 RX ADMIN — LEVOTHYROXINE SODIUM SCH MCG: 75 TABLET ORAL at 06:06

## 2017-01-05 RX ADMIN — LIOTHYRONINE SODIUM SCH MCG: 5 TABLET ORAL at 08:57

## 2017-01-05 RX ADMIN — LIOTHYRONINE SODIUM SCH MCG: 5 TABLET ORAL at 06:05

## 2017-01-05 RX ADMIN — CEFAZOLIN SCH: 330 INJECTION, POWDER, FOR SOLUTION INTRAMUSCULAR; INTRAVENOUS at 17:04

## 2017-01-05 RX ADMIN — LEVOTHYROXINE SODIUM SCH MCG: 75 TABLET ORAL at 08:57

## 2017-01-05 RX ADMIN — MELOXICAM SCH MG: 7.5 TABLET ORAL at 08:28

## 2017-01-05 NOTE — PN
DATE OF SERVICE:  01/04/2017



This 61-year-old woman was admitted with chest pain, is awaiting 

stress test. No chest pain. No fever. No cough.  



On exam, alert and oriented x3. Pulse is 78, blood pressure 125/78, 

respirations 16, temperature 97.7, pulse ox 99% on room air.  

HEENT:  Conjunctivae normal. 

NECK:  No jugular venous distention. 

CARDIOVASCULAR: S1 and S2, muffled. 

RESPIRATORY:  Breath sounds diminished at the bases. 

ABDOMEN:  Soft, nontender. 

LEGS: No edema, no swelling.  

NERVOUS SYSTEM:  No focal deficits. 



Labs are CBC within normal limits. Cholesterol is 228. LDL is 161. 



ASSESSMENT: 

1. Chest pain, possible angina, status post stress test. 

2. Hyperlipidemia. 

3. Heparin monitoring. 

4. Fibromyalgia. 

5. POTS syndrome. 

6. Hypertension. 

7. Hyperlipidemia. 

8. History of degenerative joint disease. 

9. History of MTHFR mutation. 

10. Hypothyroidism. 

11. History of back surgery, degenerative joint disease. 

12. History of cardiac catheterization. 

13. History of depression. 

14. Remote history of nicotine dependence. 

15. Obesity, body mass index 38.7. 



RECOMMENDATIONS AND DISCUSSION: Recommend to continue with current 

medications. Continue with symptomatic treatment.  Otherwise, at this 

time I would recommend continue with current medications, otherwise, 

await stress test report. Add agents. Continue to monitor.  

Further recommendations to follow.  



MTDD

## 2017-01-05 NOTE — P.PCN
Date of Procedure: 01/05/17


Preoperative Diagnosis: 


Positive stress test and chest pains.





Postoperative Diagnosis: 


Normal coronary arteries.  Cardiomyopathy


Procedure(s) Performed: 


Left heart catheterization with left ventriculography


Description of Procedure: 


HISTORY: This is a 61-year-old female with history of Pott's syndrome and  

hypertension who was admitted to the hospital with complaints of chest pain and 

palpitations.  Her cardiac enzymes and EKGs were negative.  Patient had a 

nuclear stress test that was described as showing reversible ischemia in the 

anteroapical wall with impaired LV function with an ejection fraction of 25%.  

Echo Cardigan showed an ejection fraction about 50%.  Patient is advised to 

have a cardiac catheterization for definitive diagnosis.





CONSENT:I have discussed the risks, benefits and alternative therapies for the 

above-mentioned procedure and for both sedation/analgesia as well as necessary 

blood product administration, if indicated, as they pertain to this patient.  

The patient has indicated understanding and acceptance of the risks and 

procedures discussed.











PROCEDURE: Patient was brought to the lab in a fasting state.  Patient was 

given some IV sedation.  The right groin is infiltrated with lidocaine and 

right femoral artery was entered using Seldinger technique.  A 6-Ethiopian 

catheter was left in place and selective coronary arteriography and left 

ventriculography was performed.  Patient tolerated the procedure well.  Femoral 

angiogram was performed and Angio-Seal was applied for hemostasis.  No 

immediate complications were noted and patient was transferred to [facility] in a 

stable condition





HEMODYNAMICS: The aortic pressure is about 120/70.  Left ventricular end-

diastolic pressure is about 10.  There is no gradient across the aortic valve





SELECTIVE CORONARY ARTERIOGRAPHY:


                          LEFT MAIN: Normal length and patent


                          THE LEFT ANTERIOR DESCENDING CORONARY ARTERY: This is 

a good caliber vessel giving rise to good-sized diagonal septal branches.  The 

LAD and branches are free of any occlusive disease.


                          THE LEFT CIRCUMFLEX AND IS CORONARY ARTERY: This is a 

nondominant vessel and free of any occlusive disease


                          THE RIGHT CORONARY ARTERY: This is a dominant vessel 

giving rise good-sized PDA.  Free of any occlusive disease





      





LEFT VENTRICULOGRAPHY: This revealed a large left ventricle with mild to 

moderate generalized hypokinesia.  Ejection fraction 35-40%





FINAL IMPRESSION: #1.  No critical coronary artery disease #2.  Cardiomyopathy 

with the generalized hypokinesia and ejection fraction of 35-40%





PLAN: Maximum medical therapy with ACE inhibitor, Coreg and spironolactone





PROGNOSIS: Fair

## 2017-01-06 VITALS — TEMPERATURE: 97.8 F | SYSTOLIC BLOOD PRESSURE: 131 MMHG | DIASTOLIC BLOOD PRESSURE: 60 MMHG | HEART RATE: 84 BPM

## 2017-01-06 RX ADMIN — LIOTHYRONINE SODIUM SCH MCG: 5 TABLET ORAL at 06:35

## 2017-01-06 RX ADMIN — PANTOPRAZOLE SODIUM SCH MG: 40 TABLET, DELAYED RELEASE ORAL at 06:36

## 2017-01-06 RX ADMIN — MELOXICAM SCH MG: 7.5 TABLET ORAL at 08:29

## 2017-01-06 RX ADMIN — SERTRALINE HYDROCHLORIDE SCH MG: 100 TABLET ORAL at 08:30

## 2017-01-06 RX ADMIN — CEFAZOLIN SCH MLS/HR: 330 INJECTION, POWDER, FOR SOLUTION INTRAMUSCULAR; INTRAVENOUS at 05:18

## 2017-01-06 RX ADMIN — LEVOTHYROXINE SODIUM SCH MCG: 75 TABLET ORAL at 06:35

## 2017-01-06 RX ADMIN — CARVEDILOL SCH MG: 3.12 TABLET, FILM COATED ORAL at 06:36

## 2017-01-06 RX ADMIN — THERA TABS SCH: TAB at 14:20

## 2017-01-06 RX ADMIN — ASPIRIN 325 MG ORAL TABLET SCH MG: 325 PILL ORAL at 08:29

## 2017-01-06 RX ADMIN — LISINOPRIL SCH MG: 10 TABLET ORAL at 08:29

## 2017-01-06 NOTE — P.PN
Subjective





This is a 61-year-old  female with documented history of Sepulveda, 

hypertension, hyperlipidemia, untreated, no diabetes, she is a nonsmoker, 

history of arthritis with multiple joint replacements, hypothyroidism.  She 

presents to the hospital with symptoms of midsternal chest pressure with 

radiation through to the shoulder blades, positive associated shortness of 

breath and diaphoresis.  She states that she initially thought these were just 

her symptoms which she experiences from Sepulveda, however this was much different, 

according to her the symptoms lasted approximately an hour in duration.  She 

did have one more episode through the night last night, relieved with 

nitroglycerin.  According to the patient she did undergo a cardiac 

catheterization approximately 15 years ago which was reported to be normal.  

Chest x-ray on admission here did not reveal any acute cardiopulmonary process.

  Initial EKG on arrival here showed a normal sinus rhythm with bigeminal PVCs.

  No acute changes noted.  Repeat EKG performed last evening showed a normal 

sinus rhythm with occasional PVC, no acute changes noted.  Troponins were 

negative 3.  The patient underwent a stress test which was suggestive of stress

-induced ischemia involving the apex and apical anterior myocardium, and for 

this reason she underwent a cardiac catheterization by Dr. Ely 

yesterday.  Cardiac catheterization did not reveal any significant obstructive 

coronary artery disease, he did reveal an ejection fraction of 35%.  As patient'

s medication adjustments were made.  Patient is currently on aspirin 81 mg daily

, Coreg 3.125 mg twice a day, Synthroid, lisinopril  10 mg daily, and Aldactone 

25 mg daily.  She has been up ambulating today without any difficulty.





Objective





- Vital Signs


Vital signs: 


 Vital Signs











Temp  97.8 F   01/06/17 12:05


 


Pulse  84   01/06/17 12:05


 


Resp  18   01/06/17 12:05


 


BP  131/60   01/06/17 12:05


 


Pulse Ox  97   01/06/17 12:05








 Intake & Output











 01/05/17 01/06/17 01/06/17





 18:59 06:59 18:59


 


Intake Total 270 425 656


 


Output Total 300 850 


 


Balance -30 -425 656


 


Weight  102.6 kg 


 


Intake:   


 


  IV 50  


 


  Intake, IV Titration  225 





  Amount   


 


    Sodium Chloride 0.9% 1,  225 





    000 ml @ 75 mls/hr IV .   





    [id number] [facility] Rx#:[id number]   


 


  Oral 220 200 656


 


Output:   


 


  Urine 300 850 


 


Other:   


 


  Voiding Method   Toilet














- Exam





PHYSICAL EXAMINATION: 





HEENT: Head is atraumatic, normocephalic.  Pupils equal, round.  Neck is 

supple.  There is no elevated jugular venous pressure.





HEART EXAMINATION: Heart S1 and S2 with soft systolic murmur is heard.





CHEST EXAMINATION: Lungs are clear to auscultation and precussion. No chest 

wall tenderness is noted on palpation or with deep breathing.





ABDOMEN:  Soft, nontender. Bowel sounds are heard. No organomegaly noted.  

Right groin soft, no evidence of any hematoma.


 


EXTREMITIES: 2+ peripheral pulses with no evidence of peripheral edema and no 

calf tenderness noted.





NEUROLOGIC patient is awake, alert and oriented -3.


 





- Labs


CBC & Chem 7: 


 01/05/17 06:09





 01/05/17 06:09





Assessment and Plan


Plan: 


Assessment and plan


#1 chest pain troponins 3 have been negative.  EKG shows normal sinus rhythm 

with no acute changes.  Stress test was suggestive of reversible ischemia, 

patient underwent cardiac catheterization which did not reveal any obstructive 

coronary artery disease.  Ejection fraction 35-40%.


#2 history of Sepulveda


#3 hypertension


#4 hyperlipidemia, untreated.


# 5 hypothyroidism


#6 arthritis with multiple joint replacement surgeries








Plan


Patient may be able to be discharged home once cleared by the primary.  Follow-

up appointment with Dr. Ely in the office post discharge.  

Prescriptions for medications have been provided.





DNP note has been reviewed, I agree with a documented findings and plan of 

care.  Patient was seen and examined.

## 2017-01-07 NOTE — DS
DATE OF ADMISSION:   01/03/2017

DATE OF DISCHARGE:   01/06/2017



FINAL DIAGNOSES: 

1. Chest pain unstable angina secondary to possible coronary spasm or 

coronary artery disease with cardiac catheterization showing no 

occluding coronary artery disease.  

2. Ejection fraction 35 to 40% during the cardiac catheterization 

showing systolic dysfunction, possibly chronic indicating possible 

cardiomyopathy.  

3. Hyperlipidemia. 

4. Fibromyalgia. 

5. WALSH. 

6. Hypertension. 

7. Hyperlipidemia. 

8. History of degenerative joint disease  

9. History MTHFR mutation. 

10. History of hypothyroidism. 

11. History of back surgeries. 

12. History of degenerative joint disease. 

13. History of cardiac catheterization. 

14. History of depression. 

15. History of nicotine dependence.

16. Obesity, body mass index 38.7. 

17. FULL CODE. 



DISCHARGE DISPOSITION: The patient will be discharged in a stable 

condition with guarded prognosis. The discharge cleared by Cardiology. 

 



HISTORY OF PRESENT ILLNESS: This 61-year-old woman with a past medical 

history of multiple medical problems was admitted with chest pain, had 

cardiac catheterization. The patient also had abnormal stress test. 

Cardiac catheterization showed no evidence of necrosis, coronary 

artery disease. Patient was treated symptomatically. Patient also had 

low ejection fraction. Cardiology saw the patient. Medications 

adjusted. The patient will be discharged in a stable condition with 

guarded prognosis.  



DISCHARGE ADVICE:

1. Diet is cardiac. 

2. Activity limited until follow-up. 

3. Follow-up with Dr. Baez in 2 to 3 days. 

4. Follow with Dr. Ely as recommended. 



MEDICATIONS: 

1. Xanax 0.5 p.o. b.i.d. p.r.n. 

2. Albuterol 2 puffs q.i.d. p.r.n. 

3. Aspirin 81 mg p.o. daily. 

4. Lipitor 10 mg q.h.s. 

5. Coreg 3.125 mg p.o. b.i.d. 

6. Flexeril 5 mg b.i.d. p.r.n. 

7. Norco 10 mg 1 to 2 tablets p.o. q.6 p.r.n. 

8. Synthroid 75 mcg p.o. daily. 

9. Cytomel 10 mcg p.o. daily. 

10. Zestril 10 mg p.o. daily. 

11. Milk of Thistle 150 mg p.o. daily. 

12. Multivitamin 1 p.o. daily. 

13. Relafen 1500 mg p.o. q.a.m.  

14. Nitrostat 0.4 sublingual p.r.n. 

15. Prilosec 40 mg p.o. daily. 

16. Maxalt 10 mg p.o. daily. 

17. Zoloft 150 mg p.o. daily. 

18. Aldactone 25 mg p.o. daily.



Once again, the patient will be discharged in a stable condition with 

guarded prognosis.

## 2017-06-12 ENCOUNTER — HOSPITAL ENCOUNTER (EMERGENCY)
Dept: HOSPITAL 47 - EC | Age: 62
Discharge: HOME | End: 2017-06-12
Payer: COMMERCIAL

## 2017-06-12 VITALS — RESPIRATION RATE: 18 BRPM

## 2017-06-12 VITALS — HEART RATE: 84 BPM | TEMPERATURE: 98.4 F | SYSTOLIC BLOOD PRESSURE: 108 MMHG | DIASTOLIC BLOOD PRESSURE: 54 MMHG

## 2017-06-12 DIAGNOSIS — Z87.891: ICD-10-CM

## 2017-06-12 DIAGNOSIS — R07.9: ICD-10-CM

## 2017-06-12 DIAGNOSIS — J90: ICD-10-CM

## 2017-06-12 DIAGNOSIS — E78.5: ICD-10-CM

## 2017-06-12 DIAGNOSIS — Z79.1: ICD-10-CM

## 2017-06-12 DIAGNOSIS — Z79.899: ICD-10-CM

## 2017-06-12 DIAGNOSIS — E07.9: ICD-10-CM

## 2017-06-12 DIAGNOSIS — I10: ICD-10-CM

## 2017-06-12 DIAGNOSIS — M62.838: Primary | ICD-10-CM

## 2017-06-12 DIAGNOSIS — F32.9: ICD-10-CM

## 2017-06-12 DIAGNOSIS — Z95.810: ICD-10-CM

## 2017-06-12 DIAGNOSIS — M19.90: ICD-10-CM

## 2017-06-12 DIAGNOSIS — Z88.5: ICD-10-CM

## 2017-06-12 PROCEDURE — 71020: CPT

## 2017-06-12 PROCEDURE — 96374 THER/PROPH/DIAG INJ IV PUSH: CPT

## 2017-06-12 PROCEDURE — 96375 TX/PRO/DX INJ NEW DRUG ADDON: CPT

## 2017-06-12 PROCEDURE — 93005 ELECTROCARDIOGRAM TRACING: CPT

## 2017-06-12 PROCEDURE — 99285 EMERGENCY DEPT VISIT HI MDM: CPT

## 2017-06-12 NOTE — ED
General Adult HPI





- General


Chief complaint: Chest Pain


Stated complaint: Chest Pain


Time Seen by Provider: 06/12/17 02:10


Source: EMS, RN notes reviewed


Mode of arrival: EMS


Limitations: no limitations





- History of Present Illness


Initial comments: 





This is a 61-year-old female who states she just recently had a defibrillator 

placed and since she's been having muscle spasms in her chest that radiated 

around to the back.  Patient states they come and extremely sharp in the 

microwave and they come right back again.  Patient states taking deep breaths 

or moving seems to set it off.  Patient states been ongoing for the last few 

days.  Patient states she followed up with her cardiologist last week and he 

didn't seem to think much of the muscle spasms.  Patient states she's been 

taking Flexeril as she always does and it does not seem to help her spasms.  

Patient denies any shortness of breath but she does state taking deep breaths 

of spasms off.  Patient denies any internal chest pain.  Patient states this is 

right in her muscles externally.  Patient denies any fever chills or cough.





- Related Data


 Home Medications











 Medication  Instructions  Recorded  Confirmed


 


ALPRAZolam [Xanax] 0.25 mg PO BID PRN 05/02/16 01/03/17


 


Albuterol Inhaler [Ventolin Hfa 2 puff INHALATION RT-Q6H PRN 05/02/16 01/03/17





Inhaler]   


 


Cyclobenzaprine [Flexeril] 5 mg PO TID PRN 05/02/16 01/03/17


 


Levothyroxine Sodium [Synthroid] 75 mcg PO DAILY 05/02/16 01/03/17


 


Nabumetone [Relafen] 1,500 mg PO QAM 05/02/16 01/03/17


 


Omeprazole [PriLOSEC] 40 mg PO DAILY 05/02/16 01/03/17


 


Liothyronine Sodium [Cytomel] 10 mcg PO DAILY 08/31/16 01/03/17


 


Multivitamins, Thera [Multivitamin 1 tab PO DAILY 08/31/16 01/03/17





(formulary)]   


 


Sertraline [Zoloft] 150 mg PO DAILY 08/31/16 01/03/17


 


Rizatriptan Odt [Maxalt MLT] 10 mg PO DAILY PRN 09/12/16 01/03/17


 


Lisinopril [Zestril] 10 mg PO DAILY 12/14/16 01/03/17


 


Milk Thistle 150 mg PO DAILY 12/14/16 01/03/17


 


HYDROcodone/APAP 10-325MG [Norco 1 - 2 tab PO Q6H PRN 01/03/17 01/03/17





]   








 Previous Rx's











 Medication  Instructions  Recorded


 


Atorvastatin Calcium [Lipitor] 10 mg PO HS #30 tab 01/04/17


 


Aspirin 81 mg PO DAILY #30 chew 01/06/17


 


Carvedilol [Coreg] 3.125 mg PO BID-W/MEALS #60 tab 01/06/17


 


Nitroglycerin Sl Tabs [Nitrostat] 0.4 mg SUBLINGUAL Q5M PRN #25 tab 01/06/17


 


Spironolactone [Aldactone] 25 mg PO DAILY #30 tab 01/06/17


 


Diazepam [Valium] 5 mg PO Q6H #10 tab 06/12/17











 Allergies











Allergy/AdvReac Type Severity Reaction Status Date / Time


 


codeine Allergy  Nausea & Verified 06/12/17 02:11





   Vomiting  














Review of Systems


ROS Statement: 


Those systems with pertinent positive or pertinent negative responses have been 

documented in the HPI.





ROS Other: All systems not noted in ROS Statement are negative.





Past Medical History


Past Medical History: Cancer, Fibromyalgia, Hyperlipidemia, Hypertension, 

Osteoarthritis (OA), Thyroid Disorder


Additional Past Medical History / Comment(s): POTS,MTHFR mutation, hx melanoma


History of Any Multi-Drug Resistant Organisms: None Reported


Past Surgical History: Back Surgery, Heart Catheterization, Hysterectomy, Joint 

Replacement, Orthopedic Surgery


Additional Past Surgical History / Comment(s): bilateral knee replacement, and 

bilateral shoulders, joint rt thumb replaced and revised.  Heart cath 15 years 

ago with no stents. Right knee revision in 2016. Back surgery 5 years ago.


Past Anesthesia/Blood Transfusion Reactions: Previous Problems w/ Anesthesia, 

Postoperative Nausea & Vomiting (PONV)


Additional Past Anesthesia/Blood Transfusion Reaction / Comment(s): POTS 

episode during anesthesia (severe drop in BP after previous knee replacement 

surgery)


Past Psychological History: Depression


Smoking Status: Former smoker


Past Alcohol Use History: Occasional


Additional Past Alcohol Use History / Comment(s): quit smoking couple years 

ago. Smoke off and on for 30 years < 1/2 PPD


Past Drug Use History: None Reported





- Past Family History


  ** Sister(s)


Family Medical History: Cancer





  ** Brother(s)


Family Medical History: Cancer





General Exam





- General Exam Comments


Initial Comments: 





GENERAL:


Patient is well-developed and well-nourished.  Patient is nontoxic and well-

hydrated and is in mild distress.





ENT:


Neck is soft and supple.  No significant lymphadenopathy is noted.  Oropharynx 

is clear.  Moist mucous membranes.  Neck has full range of motion without 

eliciting any pain. 





EYES:


The sclera were anicteric and conjunctiva were pink and moist.  Extraocular 

movements were intact and pupils were equal round and reactive to light.  

Eyelids were unremarkable.





PULMONARY:


Unlabored respirations.  Good breath sounds bilaterally.  No audible rales 

rhonchi or wheezing was noted.





CARDIOVASCULAR:


There is a regular rate and rhythm without any murmurs gallops or rubs.  

Palpating the area around the defibrillator was tender.





ABDOMEN:


Soft and nontender with normal bowel sounds.  No palpable organomegaly was 

noted.  There is no palpable pulsatile mass.





SKIN:


Skin is clear with no lesions or rashes and otherwise unremarkable.





NEUROLOGIC:


Patient is alert and oriented x3.  Cranial nerves II through XII are grossly 

intact.  Motor and sensory are also intact.  Normal speech, volume and content.

  Symmetrical smile.  





MUSCULOSKELETAL:


Normal extremities with adequate strength and full range of motion.  





LYMPHATICS:


No significant lymphadenopathy is noted





PSYCHIATRIC:


Normal psychiatric evaluation.  


Limitations: no limitations





Course


 Vital Signs











  06/12/17





  02:08


 


Temperature 97.7 F


 


Pulse Rate 83


 


Respiratory 18





Rate 


 


Blood Pressure 139/78


 


O2 Sat by Pulse 97





Oximetry 














Medical Decision Making





- Medical Decision Making





EKG shows a sinus rhythm with frequent PVCs at 80 bpm MD interval is 144 QRS is 

94 QT interval 392 QTC is 452.  Patient's EKG shows no ST segment elevation or 

depression. EKG is unchanged when compared to an old EKG.





Chest x-ray shows no acute abnormality.





I gave the patient Toradol and Valium and the patient's pain was completely 

resolved.





Disposition


Clinical Impression: 


 Muscle spasm





Disposition: HOME SELF-CARE


Condition: Good


Instructions:  Muscle Spasm (ED)


Prescriptions: 


Diazepam [Valium] 5 mg PO Q6H #10 tab


Referrals: 


Tha Baez MD [Primary Care Provider] - 1-2 days


Time of Disposition: 02:55

## 2017-06-12 NOTE — XR
EXAM:

  XR Chest, 2 Views

 

CLINICAL HISTORY:

  Reason: Difficulty breathing

 

TECHNIQUE:

  Frontal and lateral views of the chest.

 

COMPARISON:

  1/3/2017

 

FINDINGS:

  Lungs:  Left basilar opacity may represent atelectasis or consolidation.

  The right lung is clear.

  Pleural space:  Small left pleural effusion.  No pneumothorax.

  Heart:  Unremarkable.  No cardiomegaly.

  Mediastinum:  Stable cardiomediastinal silhouette.  Again seen is a 

retrocardiac opacity, possibly a hiatal hernia.

  Bones/joints:  No acute osseous abnormality.

  Tubes, lines and devices:  New left chest wall AICD.  Telemetry leads 

overlie the patient.

 

IMPRESSION:     

  Small left pleural effusion.  Left basilar opacity may represent 

atelectasis or consolidation.

## 2017-08-30 ENCOUNTER — HOSPITAL ENCOUNTER (OUTPATIENT)
Dept: HOSPITAL 47 - RADMAMWWP | Age: 62
Discharge: HOME | End: 2017-08-30
Payer: COMMERCIAL

## 2017-08-30 DIAGNOSIS — N63: Primary | ICD-10-CM

## 2017-08-30 PROCEDURE — 76641 ULTRASOUND BREAST COMPLETE: CPT

## 2017-08-31 NOTE — USB
Reason for exam: clinical finding.



History:

Patient has history of other cancer at age 46.

Benign core biopsy of the left breast.

Taking estrogen beginning at age 59.



US Breast LT

Left breast ultrasound includes all four quadrants, the retroareolar region and 

axilla. Finding demonstrates a 8 x 6 x 10mm oval, solid, hypoechoic lesion at 

palpable at 6 o'clock and a 4 x 4 x 4mm oval, solid, hyperechoic lesion at 10 

o'clock. Lipomas benign.



These results were verbally communicated with the patient and result sheet given 

to the patient on 8/30/17.





ASSESSMENT: Benign, BI-RAD 2



RECOMMENDATION:

Routine screening mammogram of both breasts in 1 year.

Manage patient on a clinical basis.

## 2019-02-21 ENCOUNTER — HOSPITAL ENCOUNTER (OUTPATIENT)
Dept: HOSPITAL 47 - PNWHC3 | Age: 64
Discharge: HOME | End: 2019-02-21
Payer: COMMERCIAL

## 2019-02-21 VITALS
DIASTOLIC BLOOD PRESSURE: 89 MMHG | RESPIRATION RATE: 16 BRPM | HEART RATE: 77 BPM | TEMPERATURE: 97.5 F | SYSTOLIC BLOOD PRESSURE: 134 MMHG

## 2019-02-21 VITALS — BODY MASS INDEX: 36.6 KG/M2

## 2019-02-21 DIAGNOSIS — Z79.82: ICD-10-CM

## 2019-02-21 DIAGNOSIS — M96.1: Primary | ICD-10-CM

## 2019-02-21 DIAGNOSIS — M19.90: ICD-10-CM

## 2019-02-21 DIAGNOSIS — M79.7: ICD-10-CM

## 2019-02-21 DIAGNOSIS — I10: ICD-10-CM

## 2019-02-21 DIAGNOSIS — Z79.899: ICD-10-CM

## 2019-02-21 DIAGNOSIS — M47.816: ICD-10-CM

## 2019-02-21 DIAGNOSIS — E78.5: ICD-10-CM

## 2019-02-21 DIAGNOSIS — Z87.891: ICD-10-CM

## 2019-02-21 DIAGNOSIS — Z90.710: ICD-10-CM

## 2019-02-21 DIAGNOSIS — Z88.5: ICD-10-CM

## 2019-02-21 DIAGNOSIS — Z79.1: ICD-10-CM

## 2019-02-21 DIAGNOSIS — Z98.890: ICD-10-CM

## 2019-02-21 PROCEDURE — 99211 OFF/OP EST MAY X REQ PHY/QHP: CPT

## 2019-02-21 NOTE — P.PAINCN
History of Present Illness





- Reason for Consult


Consult date: 02/21/19





- History of Present Illness





Tami is a 63 year old female presents today as a new patient consult for low 

back pain.  [facility] was [facility] here [facility] for radiofrequency ablation.

  The one her to have it here due to her medical condition for which she has a 

pacemaker defibrillator.  She has a weak heart secondary to history of POTS.  

She reports she does not have any problems with her heart at this time.  She 

does not think her defibrillators when off in the past.  As for her pain she 

has back pain going across the low back and in to the thoracic spine.  She 

feels pain across the low back which is sharp shooting in nature is worse with 

standing for prolonged periods such as when doing dishes.  She feels that she 

cannot walk very far because of the pain.  She denies any lower extremity 

radicular symptoms.  She has had bilateral total knee replacements in the past 

and has some knee pain.  He reports that she is on any blood thinning 

medications at this time.  She is interested in having a radiofrequency 

ablation because she had excellent relief from the medial branch blocks.  She 

has had a history of back surgery many years ago and had a L4-L5 laminectomy





Past Medical History


Past Medical History: Cancer, Fibromyalgia, Hyperlipidemia, Hypertension, 

Osteoarthritis (OA), Thyroid Disorder


Additional Past Medical History / Comment(s): POTS,MTHFR mutation, hx melanoma


History of Any Multi-Drug Resistant Organisms: None Reported


Past Surgical History: AICD, Back Surgery, Heart Catheterization, Hysterectomy, 

Joint Replacement, Orthopedic Surgery


Additional Past Surgical History / Comment(s): bilateral knee replacement, and 

bilateral shoulders, joint rt thumb replaced and revised.  Heart cath 15 years 

ago with no stents. Right knee revision in 2016. Back surgery 5 years ago.


Past Anesthesia/Blood Transfusion Reactions: Previous Problems w/ Anesthesia, 

Postoperative Nausea & Vomiting (PONV)


Additional Past Anesthesia/Blood Transfusion Reaction / Comm: POTS episode 

during anesthesia (severe drop in BP after previous knee replacement surgery)


Type of Cardiac Device: AICD


Device Placement Date:: 6/2016


Smoking Status: Former smoker





- Past Family History


  ** Sister(s)


Family Medical History: Cancer





  ** Brother(s)


Family Medical History: Cancer





Medications and Allergies


 Home Medications











 Medication  Instructions  Recorded  Confirmed  Type


 


ALPRAZolam [Xanax] 0.25 mg PO BID PRN 05/02/16 02/20/19 History


 


Cyclobenzaprine [Flexeril] 5 mg PO TID PRN 05/02/16 02/20/19 History


 


Nabumetone [Relafen] 1,500 mg PO QAM 05/02/16 02/20/19 History


 


Omeprazole [PriLOSEC] 40 mg PO DAILY 05/02/16 02/20/19 History


 


Liothyronine Sodium [Cytomel] 10 mcg PO DAILY 08/31/16 02/20/19 History


 


Multivitamins, Thera [Multivitamin 1 tab PO DAILY 08/31/16 02/20/19 History





(formulary)]    


 


Rizatriptan Odt [Maxalt MLT] 10 mg PO DAILY PRN 09/12/16 02/20/19 History


 


HYDROcodone/APAP 10-325MG [Norco 1 - 2 tab PO Q6H PRN 01/03/17 02/20/19 History





]    


 


Atorvastatin Calcium [Lipitor] 10 mg PO HS #30 tab 01/04/17 02/20/19 Rx


 


Aspirin 81 mg PO DAILY #30 chew 01/06/17 02/20/19 Rx


 


Aspirin 325 mg PO DAILY 02/20/19 02/20/19 History


 


Levothyroxine Sodium [Synthroid] 50 mcg PO DAILY 02/20/19 02/20/19 History


 


Metoprolol Succinate (ER) [Toprol 50 mg PO DAILY 02/20/19 02/20/19 History





Xl]    


 


Sacubitril/Valsartan [Entresto 97 1 tab PO BID 02/20/19 02/20/19 History





mg-103 mg Tablet]    


 


Sertraline [Zoloft] 200 mg PO DAILY 02/20/19 02/20/19 History











 Allergies











Allergy/AdvReac Type Severity Reaction Status Date / Time


 


codeine Allergy  Nausea & Verified 02/20/19 11:14





   Vomiting  














Physical Exam


Vitals: 


 Vital Signs











  Temp Pulse Resp BP


 


 02/21/19 11:57  97.5 F L  77  16  134/89














General: Awake and alert oriented 3 no distress


Respiratory exam: No audible wheezing no accessory muscle usage


Cardiovascular exam: regular rate, palpable bilateral pulses, no lower 

extremity edema


Abdominal exam: No distention nontender to palpation





Cervical spine: Normal alignment, Spurling's negative, facet loading negative





Lumbar spine: Surgical scar well-healed, Loss of lumbar lordosis, normal 

alignment, tender to palpation over bilateral paraspinal muscles, facet loading 

is positive bilaterally.  Straight leg raise is negative.  Range of motion is 

normal with flexion, limited extension of the lumbar spine.





Sacroiliac joints: Nontender to palpation, SHAHANA is negative, Gaenselon negative





Neuro exam: Normal sensation in bilateral upper extremities, deep tendon 

reflexes are 2+ bilateral upper extremities.  Normal sensation in bilateral 

lower extremities.  Deep tendon reflexes are 1+ in lower extremities (total 

knee replacement bilateral)





Psych exam: Cooperative, appropriate mood





Assessment and Plan


Assessment: 





Lumbar spondylosis without myelopathy


Lumbar postlaminectomy syndrome


Plan: 





At this time we will move forward with the radiofrequency ablation of both 

sides of L3 through S1.  The records from [facility] with the patient that they'

ve done medial branch blocks on both sides at L3 through S1 levels.  The 

patient is requesting sedation for the procedure she is scared of needles.  I 

discussed the procedure in detail with the patient in 1 over the risks benefits 

and alternatives to the procedure.  The patient would like to move forward will 

schedule her for bilateral radiofrequency ablation to be done on the same day 

given her medical condition.





PQRS Measure Charge Sheet


PQRS Narrative: 


 





Smoking Status                   Former smoker


Blood Pressure                   134/89


Pain Intensity [Back]            2


Pain Intensity [Lower Back]      3


Scale Used                       Numeric (1 - 10)








Home Medications: 


Ambulatory Orders





ALPRAZolam [Xanax] 0.25 mg PO BID PRN 05/02/16 


Cyclobenzaprine [Flexeril] 5 mg PO TID PRN 05/02/16 


Nabumetone [Relafen] 1,500 mg PO QAM 05/02/16 


Omeprazole [PriLOSEC] 40 mg PO DAILY 05/02/16 


Liothyronine Sodium [Cytomel] 10 mcg PO DAILY 08/31/16 


Multivitamins, Thera [Multivitamin (formulary)] 1 tab PO DAILY 08/31/16 


Rizatriptan Odt [Maxalt MLT] 10 mg PO DAILY PRN 09/12/16 


HYDROcodone/APAP 10-325MG [Norco ] 1 - 2 tab PO Q6H PRN 01/03/17 


Atorvastatin Calcium [Lipitor] 10 mg PO HS #30 tab 01/04/17 


Aspirin 81 mg PO DAILY #30 chew 01/06/17 


Aspirin 325 mg PO DAILY 02/20/19 


Levothyroxine Sodium [Synthroid] 50 mcg PO DAILY 02/20/19 


Metoprolol Succinate (ER) [Toprol Xl] 50 mg PO DAILY 02/20/19 


Sacubitril/Valsartan [Entresto 97 mg-103 mg Tablet] 1 tab PO BID 02/20/19 


Sertraline [Zoloft] 200 mg PO DAILY 02/20/19

## 2019-03-07 ENCOUNTER — HOSPITAL ENCOUNTER (OUTPATIENT)
Dept: HOSPITAL 47 - ORPAIN | Age: 64
Discharge: HOME | End: 2019-03-07
Payer: COMMERCIAL

## 2019-03-07 VITALS — TEMPERATURE: 97.6 F | RESPIRATION RATE: 16 BRPM

## 2019-03-07 VITALS — BODY MASS INDEX: 36 KG/M2

## 2019-03-07 VITALS — HEART RATE: 84 BPM | SYSTOLIC BLOOD PRESSURE: 113 MMHG | DIASTOLIC BLOOD PRESSURE: 75 MMHG

## 2019-03-07 DIAGNOSIS — M47.816: Primary | ICD-10-CM

## 2019-03-07 DIAGNOSIS — E78.5: ICD-10-CM

## 2019-03-07 DIAGNOSIS — M19.90: ICD-10-CM

## 2019-03-07 DIAGNOSIS — Z79.82: ICD-10-CM

## 2019-03-07 DIAGNOSIS — Z87.891: ICD-10-CM

## 2019-03-07 DIAGNOSIS — M79.7: ICD-10-CM

## 2019-03-07 DIAGNOSIS — Z95.810: ICD-10-CM

## 2019-03-07 DIAGNOSIS — Z88.5: ICD-10-CM

## 2019-03-07 DIAGNOSIS — M96.1: ICD-10-CM

## 2019-03-07 DIAGNOSIS — Z79.899: ICD-10-CM

## 2019-03-07 DIAGNOSIS — Z79.890: ICD-10-CM

## 2019-03-07 DIAGNOSIS — I10: ICD-10-CM

## 2019-03-07 DIAGNOSIS — E72.12: ICD-10-CM

## 2019-03-07 DIAGNOSIS — Z96.653: ICD-10-CM

## 2019-03-07 DIAGNOSIS — Z85.820: ICD-10-CM

## 2019-03-07 DIAGNOSIS — E07.9: ICD-10-CM

## 2019-03-07 PROCEDURE — 99152 MOD SED SAME PHYS/QHP 5/>YRS: CPT

## 2019-03-07 PROCEDURE — 64636 DESTROY L/S FACET JNT ADDL: CPT

## 2019-03-07 PROCEDURE — 64635 DESTROY LUMB/SAC FACET JNT: CPT

## 2019-03-07 NOTE — P.PCN
Date of Procedure: 03/07/19


Surgeon: Krishna Frank


Description of Procedure: 





Procedure(s) Performed: 





PREOPERATIVE DIAGNOSIS: Lumbar Spondylosis





POSTOPERATIVE DIAGNOSIS: Same





PROCEDURES:  Radiofrequency ablation of bilateral L4, L5, sacral ala with 

fluoroscopic guidance





SURGEON:  Krishna Frank MD.





ANESTHESIA:  Moderate sedation with intravenous 2 mg of and as Laman 100 migrans

of fentanyl





EBL: Minimal 





PROCEDURE INDICATION: The patient with low back pain secondary to lumbar facet  

arthropathy who had more than 50% relief of  pain with previous diagnostic 

lumbar medial branch block.  These injections were performed by her primary pain

physician.  She does have a defibrillator.  Therefore, the physician referred 

her to our office to undergo radio frequency ablation in a hospital setting as 

he felt it was safer.  She is aware of the risks and benefits of the procedure 

and wishes to proceed forward.





PROCEDURE DESCRIPTION / TECHNIQUE: The patient was seen and identified in the 

preoperative area. Risks, benefits, complications, including but not limited to 

risk of infection ,bleeding , allergic reactions to the medications and 

incomplete pain relief , and alternatives were discussed with the patient, the 

patient agreed to proceed with the procedure and signed the consent. IV was 

started.  The operative site was marked.





Patient was taken to the OR and time out was completed. The patient was placed 

in the prone position on the procedure table. The lumber  area was prepped and 

draped in the usual sterile fashion. . Vital signs were closely monitored during

the procedure .IV sedation was used during the procedure to decrease patients 

anxiety. 





Using AP and then oblique fluoroscopy, the ``eye of the Angel dog 

corresponding to the connection between the superior and transverse articular 

processes of  the above-mentioned levels were identified, marked, and localized 

with 1% lidocaine.  Subsequently, a 20  [id number] radiofrequency cannula with 

a 10-mm active tip was advanced guided by fluoroscopy to each of the ``eyes of 

the Angel dog at each site then underwent sensory testing at 50 Hz and 0 to 1

volt  and motor testing at 2.5  Hz and 0 to 3 volt with local stimulation, but 

no radicular symptoms down the legs. Then the sites underwent radiofrequency 

thermocoagulation  at 80 degrees celsius for 90 seconds after injecting 0.5 ml 

of PF lidocaine 1%.   then  After the  thermocoagulation done , 1 ml of the 

block solution containing depomedrol 40 mg and 4 ml of marcaine 0.5%  was 

injected in divided doses at each levels after negative aspiration of CSF and 

blood and with no paresthesias.  Sterile dressings were applied.





COMPLICATIONS: No acute complications.





DISPOSITION / PLANS: The patient was placed in a supine position and  

transferred to the recovery area in a stable condition for observation  and was 

discharged from the recovery room after meeting discharge criteria. Home 

discharge instructions given to the patient by the staff. The patient was 

reexamined prior to discharge.  She will follow-up with her primary pain 

physician.

## 2019-04-17 ENCOUNTER — HOSPITAL ENCOUNTER (OUTPATIENT)
Dept: HOSPITAL 47 - RADCTMAIN | Age: 64
Discharge: HOME | End: 2019-04-17
Attending: PHYSICAL MEDICINE & REHABILITATION
Payer: COMMERCIAL

## 2019-04-17 DIAGNOSIS — M41.84: ICD-10-CM

## 2019-04-17 DIAGNOSIS — M99.72: Primary | ICD-10-CM

## 2019-04-17 DIAGNOSIS — M47.814: ICD-10-CM

## 2019-04-17 PROCEDURE — 72128 CT CHEST SPINE W/O DYE: CPT

## 2019-04-17 NOTE — CT
EXAMINATION TYPE: CT thoracic spine wo con

 

DATE OF EXAM: 4/17/2019

 

COMPARISON: CT chest October 15, 2012

 

HISTORY: mid back pain/ hx scoliosis

 

CT DLP: 775.1 mGycm

Automated exposure control for dose reduction was used.

 

FINDINGS: 

Thoracic spine redemonstrates S-shaped scoliosis that is levoconvex in curvature centered in the uppe
r to midthoracic spine and dextroconvex in curvature centered in the lower thoracic spine. No acute f
racture or dislocation is seen. There is moderate to advanced disc space narrowing with endplate scle
rosis and vacuum disc phenomenon and moderate left lateral spurring at the left T10-T11 level with pr
ogression from 2012 CT. Vertebral body heights are maintained. There is additional mild to moderate m
ultilevel disc space narrowing with mild multilevel anterior spurring. Demineralization is noted. Spi
nal canal is grossly preserved.

 

Review of axial and sagittal images shows no large posterior disc herniation. Small disc herniations 
felt present at T5-T6 and T7-T8 level on sagittal images mildly effacing anterior thecal sac. There i
s vacuum disc phenomenon noted at T12-L1 level. There is persistent moderate size fixed hiatal hernia
 with some surgical clips. There is new cardiac pacemaker.

 

IMPRESSION: 

S-SHAPED SCOLIOSIS WITH MULTILEVEL DEGENERATIVE CHANGES AS DETAILED ABOVE, SOME PROGRESSION FROM 2012
 CT NOTED.

## 2020-02-21 ENCOUNTER — HOSPITAL ENCOUNTER (OUTPATIENT)
Dept: HOSPITAL 47 - RADCTMAIN | Age: 65
Discharge: HOME | End: 2020-02-21
Attending: INTERNAL MEDICINE
Payer: COMMERCIAL

## 2020-02-21 DIAGNOSIS — M51.36: Primary | ICD-10-CM

## 2020-02-21 PROCEDURE — 72131 CT LUMBAR SPINE W/O DYE: CPT

## 2020-02-21 NOTE — CT
EXAMINATION TYPE: CT lumbar spine wo con

 

DATE OF EXAM: 2/21/2020

 

COMPARISON: 12/7/2017

 

HISTORY: low back pain, no injury

 

CT DLP: 1141.6 mGycm

 

 

Unenhanced CT of the lumbar spine was performed.  Bone and soft tissue window settings are submitted 
as well as coronal and sagittal reconstructions. 

 

L1-L2: Normal disc space height.  No disc herniation protrusion or central stenosis.  No facet joint 
arthropathy.  No evidence for foraminal encroachment.  

 

L2-L3: There is a large broad-based disc bulge and facet arthropathy that create mild bilateral neura
l foraminal narrowing. Extensive intervertebral disc space narrowing is seen as well as opposing surf
ace sclerosis due to the acute angle scoliosis. No spinal canal stenosis. 

  

L3-L4: Small broad-based disc bulge is present in combination with facet arthropathy and ligamentum f
lavum hypertrophy minimally narrowing the neural foramen. Mild spinal canal stenosis is present. 

  

L4-L5: Grade 2 anterolisthesis and facet arthropathy in combination with a broad-based disc bulge cre
ate severe bilateral neural foraminal narrowing. There is also moderate resultant spinal canal stenos
is. 

  

L5-S1: Normal disc space height.  No disc herniation protrusion or central stenosis.  No facet joint 
arthropathy.  No evidence for foraminal encroachment.  

 

Severe scoliosis convex to the left. Grade 1 retrolisthesis of L2 on L3 measuring 3 mm. Grade 1 anter
olisthesis L4 and L5 measuring 8 mm unchanged from prior study. Bilateral pars defects noted at this 
level. Multilevel endplate sclerosis and spondylosis. Severe facet joint arthropathy. No acute fractu
re seen.

 

IMPRESSION:

 

1. Multilevel degenerative disc disease as discussed above.

## 2020-03-04 NOTE — PN
DATE OF SERVICE: 01/05/2017



This 61 -year-old woman was admitted with chest pain and had positive 

stress.  The patient is scheduled to undergo cardiac catheterization 

today.  The cardiac catheterization done later today by Dr. Ely showed no critical coronary artery disease, 

cardiomyopathy, with generalized hypokinesia with an ejection fraction 

about 35 to 40%.  Maximal medical treatment  was recommended.  No 

chest pain. No palpitation. No fever.  



On exam the patient is alert and oriented times three.    Pulse 84, 

blood pressure 105/51, respirations 18, temperature 98.1 and pulse ox 

97% on room air.  

HEENT: Conjunctivae normal.   

NECK: No jugular venous distention.  

CARDIOVASCULAR: S1, S2 muffled.  

RESPIRATORY: Breath sounds diminished at the bases. No rhonchi, no 

crackles.  

ABDOMEN: Soft, nontender. Legs: No edema. No swelling. 

CENTRAL NERVOUS SYSTEM: No focal deficits. 



LABS: CBC, CMP within normal limits. Cholesterol 228, LDL is 161.  



ASSESSMENT: 

1. status post positive stress test and cardiac catheterization 

showing no active  occluding  coronary artery disease.  

2. ejection fraction with ejection fraction 35 to 40% during 

the cardiac catheterization indicating chronic systolic dysfunction 

with chronic congestive heart failure and possible cardiomyopathy.  

3. Hyperlipidemia. 

4.  fibromyalgia and Sepulveda syndrome.  

5. Hypertension. 

6. Hyperlipidemia. 

7. History of degenerative joint disease.

8. History of MTHFR mutation.

9. History of hypothyroidism. 

10. History of back surgery.

11. Degenerative joint disease. 

12. History of cardiac catheterization. 

13. History of depression.

14. Remote history of nicotine dependence. 

15. Obesity, body mass index 38.7. 



RECOMMENDATIONS AND DISCUSSION: Recommend to continue current 

medications, continue with monitoring.   Symptomatic treatment.  

Otherwise, continue with ACE inhibitors, beta blockers and 

antiplatelet agents and Lipitor.   Continue to monitor.  Further 

recommendations to follow.   



MTDD Benefits, risks, and possible complications of procedure explained to patient/caregiver who verbalized understanding and gave verbal consent.

## 2021-04-14 ENCOUNTER — HOSPITAL ENCOUNTER (OUTPATIENT)
Dept: HOSPITAL 47 - RADMAMWWP | Age: 66
Discharge: HOME | End: 2021-04-14
Attending: INTERNAL MEDICINE
Payer: MEDICARE

## 2021-04-14 DIAGNOSIS — Z12.31: Primary | ICD-10-CM

## 2021-04-14 PROCEDURE — 77063 BREAST TOMOSYNTHESIS BI: CPT

## 2021-04-14 PROCEDURE — 77067 SCR MAMMO BI INCL CAD: CPT

## 2021-04-14 NOTE — MM
Reason for exam: screening  (asymptomatic).

Last mammogram was performed 3 years and 7 months ago.



History:

Patient has history of other cancer at age 46.

Benign core biopsy of the left breast.

Taking estrogen beginning at age 59.



Physical Findings:

A clinical breast exam by your physician is recommended on an annual basis and 

results should be correlated with mammographic findings.



MG 3D Screening Mammo W/Cad

Bilateral CC and MLO view(s) were taken.

Prior study comparison: August 30, 2017, bilateral MG 3d diag mammo w/cad TAZ.

The breast tissue is heterogeneously dense. This may lower the sensitivity of 

mammography.  There are benign appearing round calcifications bilaterally. There 

is chronic nodularity bilaterally. There is no discrete abnormality. Left axillary

pacemaker redemonstrated.





ASSESSMENT: Benign, BI-RAD 2



RECOMMENDATION:

Routine screening mammogram of both breasts in 1 year.

## 2021-04-27 ENCOUNTER — HOSPITAL ENCOUNTER (OUTPATIENT)
Dept: HOSPITAL 47 - CPPFTMAIN | Age: 66
End: 2021-04-27
Attending: INTERNAL MEDICINE
Payer: MEDICARE

## 2021-04-27 DIAGNOSIS — Z87.891: ICD-10-CM

## 2021-04-27 DIAGNOSIS — R19.00: ICD-10-CM

## 2021-04-27 DIAGNOSIS — J45.909: Primary | ICD-10-CM

## 2021-04-27 PROCEDURE — 94060 EVALUATION OF WHEEZING: CPT

## 2021-04-27 PROCEDURE — 94726 PLETHYSMOGRAPHY LUNG VOLUMES: CPT

## 2021-04-27 PROCEDURE — 94729 DIFFUSING CAPACITY: CPT

## 2021-05-27 ENCOUNTER — HOSPITAL ENCOUNTER (OUTPATIENT)
Dept: HOSPITAL 47 - RADUSWWP | Age: 66
Discharge: HOME | End: 2021-05-27
Attending: STUDENT IN AN ORGANIZED HEALTH CARE EDUCATION/TRAINING PROGRAM
Payer: MEDICARE

## 2021-05-27 DIAGNOSIS — N83.201: Primary | ICD-10-CM

## 2021-05-27 DIAGNOSIS — Z78.0: ICD-10-CM

## 2021-05-27 DIAGNOSIS — Z90.710: ICD-10-CM

## 2021-05-27 PROCEDURE — 76856 US EXAM PELVIC COMPLETE: CPT

## 2021-05-27 NOTE — US
EXAMINATION TYPE: US pelvic complete

 

DATE OF EXAM: 5/27/2021

 

COMPARISON: NONE

 

CLINICAL HISTORY: R19.00 Intra-abdominal and pelvic swelling, mass. Known right ovarian cyst, no pain
, assess for size

 

TECHNIQUE:  TA.  Transabdominal sonographic images of the pelvis were acquired.  

 

Date of LMP:  hysterectomy

 

EXAM MEASUREMENTS:

 

Uterus:  Surgically absent 

Endometrial Stripe: Surgically absent 

Right Ovary:  3.7 x 4.2 x 2.6 cm

Left Ovary:  not seen 

 

 

1. Uterus Surgically absent

2. Endometrium:  Surgically absent

3. Right Ovary:  2.7 x 4.2 x 2.0cm simple appearing cyst lies inferior to bladder. 

4. Left Ovary:  not seen due to bowel gas

5. Bilateral Adnexa:  wnl

6. Posterior cul-de-sac:  wnl

 

 

 

IMPRESSION: 

1. No free fluid in the cul-de-sac. Status post hysterectomy.

2. Right ovarian cyst is simple appearing and measures 4.2 cm. In this 65-year-old postmenopausal fem
shin, . History states patient has known ovarian cyst but no prior report is available. Follow-up stud
y in 3 months is recommended.

## 2021-07-28 ENCOUNTER — HOSPITAL ENCOUNTER (OUTPATIENT)
Dept: HOSPITAL 47 - EC | Age: 66
Setting detail: OBSERVATION
LOS: 1 days | Discharge: HOME | End: 2021-07-29
Attending: INTERNAL MEDICINE | Admitting: INTERNAL MEDICINE
Payer: MEDICARE

## 2021-07-28 DIAGNOSIS — E03.9: ICD-10-CM

## 2021-07-28 DIAGNOSIS — Z79.01: ICD-10-CM

## 2021-07-28 DIAGNOSIS — Z85.820: ICD-10-CM

## 2021-07-28 DIAGNOSIS — Z96.653: ICD-10-CM

## 2021-07-28 DIAGNOSIS — I49.8: Primary | ICD-10-CM

## 2021-07-28 DIAGNOSIS — Z95.810: ICD-10-CM

## 2021-07-28 DIAGNOSIS — E78.5: ICD-10-CM

## 2021-07-28 DIAGNOSIS — Z79.899: ICD-10-CM

## 2021-07-28 DIAGNOSIS — I48.0: ICD-10-CM

## 2021-07-28 DIAGNOSIS — F32.9: ICD-10-CM

## 2021-07-28 DIAGNOSIS — M79.7: ICD-10-CM

## 2021-07-28 DIAGNOSIS — R20.2: ICD-10-CM

## 2021-07-28 DIAGNOSIS — Z82.49: ICD-10-CM

## 2021-07-28 DIAGNOSIS — I50.22: ICD-10-CM

## 2021-07-28 DIAGNOSIS — Z90.710: ICD-10-CM

## 2021-07-28 DIAGNOSIS — R07.89: ICD-10-CM

## 2021-07-28 DIAGNOSIS — E86.0: ICD-10-CM

## 2021-07-28 DIAGNOSIS — J45.909: ICD-10-CM

## 2021-07-28 DIAGNOSIS — Z79.890: ICD-10-CM

## 2021-07-28 DIAGNOSIS — I44.4: ICD-10-CM

## 2021-07-28 DIAGNOSIS — I11.0: ICD-10-CM

## 2021-07-28 DIAGNOSIS — I42.8: ICD-10-CM

## 2021-07-28 LAB
ALBUMIN SERPL-MCNC: 4.2 G/DL (ref 3.5–5)
ALP SERPL-CCNC: 67 U/L (ref 38–126)
ALT SERPL-CCNC: 18 U/L (ref 4–34)
ANION GAP SERPL CALC-SCNC: 12 MMOL/L
APTT BLD: 25.9 SEC (ref 22–30)
AST SERPL-CCNC: 34 U/L (ref 14–36)
BASOPHILS # BLD AUTO: 0 K/UL (ref 0–0.2)
BASOPHILS NFR BLD AUTO: 1 %
BUN SERPL-SCNC: 15 MG/DL (ref 7–17)
CALCIUM SPEC-MCNC: 9.1 MG/DL (ref 8.4–10.2)
CHLORIDE SERPL-SCNC: 109 MMOL/L (ref 98–107)
CO2 SERPL-SCNC: 19 MMOL/L (ref 22–30)
EOSINOPHIL # BLD AUTO: 0.1 K/UL (ref 0–0.7)
EOSINOPHIL NFR BLD AUTO: 2 %
ERYTHROCYTE [DISTWIDTH] IN BLOOD BY AUTOMATED COUNT: 4.4 M/UL (ref 3.8–5.4)
ERYTHROCYTE [DISTWIDTH] IN BLOOD: 13.6 % (ref 11.5–15.5)
GLUCOSE SERPL-MCNC: 107 MG/DL (ref 74–99)
HCT VFR BLD AUTO: 40.5 % (ref 34–46)
HGB BLD-MCNC: 13.5 GM/DL (ref 11.4–16)
INR PPP: 1 (ref ?–1.2)
KETONES UR QL STRIP.AUTO: (no result)
LYMPHOCYTES # SPEC AUTO: 1.1 K/UL (ref 1–4.8)
LYMPHOCYTES NFR SPEC AUTO: 17 %
MAGNESIUM SPEC-SCNC: 1.9 MG/DL (ref 1.6–2.3)
MCH RBC QN AUTO: 30.6 PG (ref 25–35)
MCHC RBC AUTO-ENTMCNC: 33.3 G/DL (ref 31–37)
MCV RBC AUTO: 91.9 FL (ref 80–100)
MONOCYTES # BLD AUTO: 0.4 K/UL (ref 0–1)
MONOCYTES NFR BLD AUTO: 7 %
NEUTROPHILS # BLD AUTO: 4.6 K/UL (ref 1.3–7.7)
NEUTROPHILS NFR BLD AUTO: 71 %
PH UR: 6.5 [PH] (ref 5–8)
PLATELET # BLD AUTO: 314 K/UL (ref 150–450)
POTASSIUM SERPL-SCNC: 3.9 MMOL/L (ref 3.5–5.1)
PROT SERPL-MCNC: 6.5 G/DL (ref 6.3–8.2)
PT BLD: 10.8 SEC (ref 9–12)
RBC UR QL: 3 /HPF (ref 0–5)
SODIUM SERPL-SCNC: 140 MMOL/L (ref 137–145)
SP GR UR: 1.01 (ref 1–1.03)
SQUAMOUS UR QL AUTO: 9 /HPF (ref 0–4)
UROBILINOGEN UR QL STRIP: 2 MG/DL (ref ?–2)
WBC # BLD AUTO: 6.4 K/UL (ref 3.8–10.6)
WBC # UR AUTO: 2 /HPF (ref 0–5)

## 2021-07-28 PROCEDURE — 85730 THROMBOPLASTIN TIME PARTIAL: CPT

## 2021-07-28 PROCEDURE — 99285 EMERGENCY DEPT VISIT HI MDM: CPT

## 2021-07-28 PROCEDURE — 96361 HYDRATE IV INFUSION ADD-ON: CPT

## 2021-07-28 PROCEDURE — 80053 COMPREHEN METABOLIC PANEL: CPT

## 2021-07-28 PROCEDURE — 81001 URINALYSIS AUTO W/SCOPE: CPT

## 2021-07-28 PROCEDURE — 96360 HYDRATION IV INFUSION INIT: CPT

## 2021-07-28 PROCEDURE — 80048 BASIC METABOLIC PNL TOTAL CA: CPT

## 2021-07-28 PROCEDURE — 94640 AIRWAY INHALATION TREATMENT: CPT

## 2021-07-28 PROCEDURE — 71046 X-RAY EXAM CHEST 2 VIEWS: CPT

## 2021-07-28 PROCEDURE — 84484 ASSAY OF TROPONIN QUANT: CPT

## 2021-07-28 PROCEDURE — 83880 ASSAY OF NATRIURETIC PEPTIDE: CPT

## 2021-07-28 PROCEDURE — 85610 PROTHROMBIN TIME: CPT

## 2021-07-28 PROCEDURE — 83605 ASSAY OF LACTIC ACID: CPT

## 2021-07-28 PROCEDURE — 93005 ELECTROCARDIOGRAM TRACING: CPT

## 2021-07-28 PROCEDURE — 85025 COMPLETE CBC W/AUTO DIFF WBC: CPT

## 2021-07-28 PROCEDURE — 36415 COLL VENOUS BLD VENIPUNCTURE: CPT

## 2021-07-28 PROCEDURE — 83735 ASSAY OF MAGNESIUM: CPT

## 2021-07-28 PROCEDURE — 93306 TTE W/DOPPLER COMPLETE: CPT

## 2021-07-28 RX ADMIN — CEFAZOLIN SCH MLS/HR: 330 INJECTION, POWDER, FOR SOLUTION INTRAMUSCULAR; INTRAVENOUS at 19:55

## 2021-07-28 NOTE — ED
General Adult HPI





- General


Chief complaint: Weakness


Stated complaint: SOB


Time Seen by Provider: 07/28/21 15:22


Source: patient, EMS


Mode of arrival: EMS


Limitations: no limitations





- History of Present Illness


Initial comments: 





Patient is a 65-year-old female, with history of POTS, asthma, hypertension, 

presenting to the emergency department via EMS with complaints of weakness, 

lightheadedness and shortness of breath.  Patient states she has been having 

more episodes of her POTS over the past few months.  Patient is supposed to see 

a pulmonologist in the next couple of days.  She does follow with cardiology.  

She states that she felt an episode coming on so she went to sit down, her 

episodes usually only lasts 10-20 minutes, this was lasting longer and she was 

not feeling improvement so she called EMS for evaluation.  She denies any falls.

 She denies any chest pain, she does admit to intermittent shortness of breath. 

She admits to some tingling into her bilateral hands and legs.  She denies any 

nausea or vomiting, no abdominal pain.  She feels general weakness.  No fevers 

or chills.  She has no further complaints at this time.  Patient's vitals are 

stable upon arrival.





- Related Data


                                Home Medications











 Medication  Instructions  Recorded  Confirmed


 


ALPRAZolam [Xanax] 0.25 mg PO BID PRN 05/02/16 07/28/21


 


Cyclobenzaprine [Flexeril] 5 mg PO TID PRN 05/02/16 07/28/21


 


Nabumetone [Relafen] 1,500 mg PO DAILY 05/02/16 07/28/21


 


Liothyronine Sodium [Cytomel] 10 mcg PO DAILY 08/31/16 07/28/21


 


Multivitamins, Thera [Multivitamin 1 tab PO DAILY 08/31/16 07/28/21





(formulary)]   


 


Rizatriptan Odt [Maxalt MLT] 10 mg PO DAILY PRN 09/12/16 07/28/21


 


HYDROcodone/APAP 10-325MG [Norco 1 tab PO Q6H PRN 01/03/17 07/28/21





]   


 


Levothyroxine Sodium [Synthroid] 50 mcg PO MOTUTHSA 02/20/19 07/28/21


 


Sacubitril/Valsartan [Entresto 97 1 tab PO BID 02/20/19 07/28/21





mg-103 mg Tablet]   


 


Sertraline [Zoloft] 150 mg PO DAILY 02/20/19 07/28/21


 


Apixaban [Eliquis] 5 mg PO BID 07/28/21 07/28/21


 


Cannabis Chewable 30 mg PO BID PRN 07/28/21 07/28/21


 


Fluticasone/Umeclidin/Vilanter 1 puff INHALATION RT-DAILY 07/28/21 07/28/21





[Trelegy Ellipta 200-62.5-25]   


 


Levothyroxine Sodium [Synthroid] 75 mcg PO SUWEFR 07/28/21 07/28/21


 


Metoprolol Succinate [Toprol XL] 100 mg PO DAILY 07/28/21 07/28/21


 


Omeprazole 20 mg PO DAILY 07/28/21 07/28/21


 


methylPREDNISolone Dose Pack See Taper PO AS DIRECTED 07/28/21 07/28/21





[Medrol Dose Pack]   











                                    Allergies











Allergy/AdvReac Type Severity Reaction Status Date / Time


 


codeine Allergy  Nausea & Verified 07/28/21 15:18





   Vomiting  














Review of Systems


ROS Statement: 


Those systems with pertinent positive or pertinent negative responses have been 

documented in the HPI.





ROS Other: All systems not noted in ROS Statement are negative.





Past Medical History


Past Medical History: Asthma, Cancer, Fibromyalgia, Hyperlipidemia, 

Hypertension, Osteoarthritis (OA), Thyroid Disorder


Additional Past Medical History / Comment(s): POTS,MTHFR mutation, hx melanoma, 

Defibillator, exercise-induced asthma.


History of Any Multi-Drug Resistant Organisms: None Reported


Past Surgical History: AICD, Back Surgery, Heart Catheterization, Hysterectomy, 

Joint Replacement, Orthopedic Surgery


Additional Past Surgical History / Comment(s): bilateral knee replacement, and 

bilateral shoulders, joint rt thumb replaced and revised.  Heart cath 15 years 

ago with no stents. Right knee revision in 2016. Back surgery 5 years ago. Prev

ious pain injections.


Past Anesthesia/Blood Transfusion Reactions: Previous Problems w/ Anesthesia, 

Postoperative Nausea & Vomiting (PONV)


Additional Past Anesthesia/Blood Transfusion Reaction / Comment(s): POTS episode

during anesthesia (severe drop in BP after previous knee replacement surgery)


Type of Cardiac Device: AICD


Device Placement Date:: 6/2016


Past Psychological History: Depression


Past Alcohol Use History: Occasional


Past Drug Use History: None Reported





- Past Family History


  ** Sister(s)


Family Medical History: Cancer





  ** Brother(s)


Family Medical History: Cancer





General Exam





- General Exam Comments


Initial Comments: 





GENERAL: 


Patient is well-developed and well-nourished.  Patient is nontoxic and in mild 

distress.





HEAD: 


Atraumatic, normocephalic.





EYES:


Pupils equal round and reactive to light, extraocular movements intact, sclera 

anicteric, conjunctiva are normal.  Eyelids were unremarkable.





ENT: 


TMs normal, nares patent, oropharynx clear without exudates.  Moist mucous 

membranes.





NECK: 


Normal range of motion, supple without lymphadenopathy or JVD.





LUNGS:


Unlabored respirations.  Breath sounds clear to auscultation bilaterally and 

equal.  No wheezes rales or rhonchi.





HEART:


Regular rate and rhythm without murmurs, rubs or gallops.





ABDOMEN: 


Soft, nontender, normoactive bowel sounds.  No guarding, no rebound.  No masses 

appreciated.





: Deferred 





MUSCULOSKELETAL: 


Normal extremities with adequate strength and normal range of motion, no pitting

or edema.  No clubbing or cyanosis.





NEUROLOGICAL: 


Patient is alert and oriented x 3.  Motor and sensory are also intact.  Cranial 

nerves II through XII grossly intact.  Symmetrical smile.  Normal speech, normal

gait.   





PSYCH:


Normal mood, normal affect.





SKIN:


 Warm, Dry, normal turgor, no rashes or lesions noted.


Limitations: no limitations





Course


                                   Vital Signs











  07/28/21 07/28/21 07/28/21





  15:07 15:11 15:30


 


Temperature   


 


Pulse Rate 71  67


 


Pulse Rate [   





Sitting]   


 


Pulse Rate [   





Standing]   


 


Pulse Rate [   





Supine]   


 


Respiratory 18  18





Rate   


 


Blood Pressure 104/58  104/58


 


Blood Pressure   





[Sitting]   


 


Blood Pressure   





[Standing]   


 


Blood Pressure   





[Supine]   


 


O2 Sat by Pulse 100 100 98





Oximetry   














  07/28/21 07/28/21 07/28/21





  15:32 17:00 18:30


 


Temperature 97.3 F L  


 


Pulse Rate  66 


 


Pulse Rate [   69





Sitting]   


 


Pulse Rate [   72





Standing]   


 


Pulse Rate [   68





Supine]   


 


Respiratory  18 





Rate   


 


Blood Pressure  109/60 


 


Blood Pressure   119/71





[Sitting]   


 


Blood Pressure   132/91





[Standing]   


 


Blood Pressure   128/62





[Supine]   


 


O2 Sat by Pulse  100 





Oximetry   














EKG Findings





- EKG Comments:


EKG Findings:: Normal sinus rhythm, left anterior fascicular block, prolonged 

QT, no signs of acute ST segment elevation.  Compared to previous on 06/12/2017.

  Ventricular rate 76, OK interval 202, .





Medical Decision Making





- Medical Decision Making





Patient is a 65-year-old female with history of POTS, hypertension, presenting 

via EMS with complaints of lightheadedness, weakness, shortness of breath that 

lasted longer and normal.  Shes been having more frequent "POTS attacks" and 

this felt similar nature but it was lasting longer than normal since called EMS 

for evaluation.  No chest pain, her EKG shows her vitals are stable upon arri

mercedez.  Labs are within normal limits including a normal troponin, BNP is 118, 

urine shows no evidence of infection, mild dehydration with ketones are 2+.  

Orthostatics were taken, these are within normal limits.  Upon reexamination, 

patient states she does feel improvement but still feels weak and feels like 

this is lasting longer than normal.  She has concerns about going home at this 

time.  Patient will be admitted for observation, echo and serial troponins will 

be ordered.  Patient accepted by Dr. Trevino.  Case discussed with Dr. Lomeli. 





- Lab Data


Result diagrams: 


                                 07/28/21 15:32





                                 07/28/21 15:32


                                   Lab Results











  07/28/21 07/28/21 07/28/21 Range/Units





  15:32 15:32 15:32 


 


WBC  6.4    (3.8-10.6)  k/uL


 


RBC  4.40    (3.80-5.40)  m/uL


 


Hgb  13.5    (11.4-16.0)  gm/dL


 


Hct  40.5    (34.0-46.0)  %


 


MCV  91.9    (80.0-100.0)  fL


 


MCH  30.6    (25.0-35.0)  pg


 


MCHC  33.3    (31.0-37.0)  g/dL


 


RDW  13.6    (11.5-15.5)  %


 


Plt Count  314    (150-450)  k/uL


 


MPV  7.9    


 


Neutrophils %  71    %


 


Lymphocytes %  17    %


 


Monocytes %  7    %


 


Eosinophils %  2    %


 


Basophils %  1    %


 


Neutrophils #  4.6    (1.3-7.7)  k/uL


 


Lymphocytes #  1.1    (1.0-4.8)  k/uL


 


Monocytes #  0.4    (0-1.0)  k/uL


 


Eosinophils #  0.1    (0-0.7)  k/uL


 


Basophils #  0.0    (0-0.2)  k/uL


 


PT   10.8   (9.0-12.0)  sec


 


INR   1.0   (<1.2)  


 


APTT   25.9   (22.0-30.0)  sec


 


Sodium    140  (137-145)  mmol/L


 


Potassium    3.9  (3.5-5.1)  mmol/L


 


Chloride    109 H  ()  mmol/L


 


Carbon Dioxide    19 L  (22-30)  mmol/L


 


Anion Gap    12  mmol/L


 


BUN    15  (7-17)  mg/dL


 


Creatinine    0.61  (0.52-1.04)  mg/dL


 


Est GFR (CKD-EPI)AfAm    >90  (>60 ml/min/1.73 sqM)  


 


Est GFR (CKD-EPI)NonAf    >90  (>60 ml/min/1.73 sqM)  


 


Glucose    107 H  (74-99)  mg/dL


 


Plasma Lactic Acid Familia     (0.7-2.0)  mmol/L


 


Calcium    9.1  (8.4-10.2)  mg/dL


 


Magnesium    1.9  (1.6-2.3)  mg/dL


 


Total Bilirubin    0.7  (0.2-1.3)  mg/dL


 


AST    34  (14-36)  U/L


 


ALT    18  (4-34)  U/L


 


Alkaline Phosphatase    67  ()  U/L


 


Troponin I     (0.000-0.034)  ng/mL


 


NT-Pro-B Natriuret Pep     pg/mL


 


Total Protein    6.5  (6.3-8.2)  g/dL


 


Albumin    4.2  (3.5-5.0)  g/dL


 


Urine Color     


 


Urine Appearance     (Clear)  


 


Urine pH     (5.0-8.0)  


 


Ur Specific Gravity     (1.001-1.035)  


 


Urine Protein     (Negative)  


 


Urine Glucose (UA)     (Negative)  


 


Urine Ketones     (Negative)  


 


Urine Blood     (Negative)  


 


Urine Nitrite     (Negative)  


 


Urine Bilirubin     (Negative)  


 


Urine Urobilinogen     (<2.0)  mg/dL


 


Ur Leukocyte Esterase     (Negative)  


 


Urine RBC     (0-5)  /hpf


 


Urine WBC     (0-5)  /hpf


 


Ur Squamous Epith Cells     (0-4)  /hpf


 


Urine Bacteria     (None)  /hpf


 


Urine Mucus     (None)  /hpf














  07/28/21 07/28/21 07/28/21 Range/Units





  15:32 15:32 15:32 


 


WBC     (3.8-10.6)  k/uL


 


RBC     (3.80-5.40)  m/uL


 


Hgb     (11.4-16.0)  gm/dL


 


Hct     (34.0-46.0)  %


 


MCV     (80.0-100.0)  fL


 


MCH     (25.0-35.0)  pg


 


MCHC     (31.0-37.0)  g/dL


 


RDW     (11.5-15.5)  %


 


Plt Count     (150-450)  k/uL


 


MPV     


 


Neutrophils %     %


 


Lymphocytes %     %


 


Monocytes %     %


 


Eosinophils %     %


 


Basophils %     %


 


Neutrophils #     (1.3-7.7)  k/uL


 


Lymphocytes #     (1.0-4.8)  k/uL


 


Monocytes #     (0-1.0)  k/uL


 


Eosinophils #     (0-0.7)  k/uL


 


Basophils #     (0-0.2)  k/uL


 


PT     (9.0-12.0)  sec


 


INR     (<1.2)  


 


APTT     (22.0-30.0)  sec


 


Sodium     (137-145)  mmol/L


 


Potassium     (3.5-5.1)  mmol/L


 


Chloride     ()  mmol/L


 


Carbon Dioxide     (22-30)  mmol/L


 


Anion Gap     mmol/L


 


BUN     (7-17)  mg/dL


 


Creatinine     (0.52-1.04)  mg/dL


 


Est GFR (CKD-EPI)AfAm     (>60 ml/min/1.73 sqM)  


 


Est GFR (CKD-EPI)NonAf     (>60 ml/min/1.73 sqM)  


 


Glucose     (74-99)  mg/dL


 


Plasma Lactic Acid Familia  2.0    (0.7-2.0)  mmol/L


 


Calcium     (8.4-10.2)  mg/dL


 


Magnesium     (1.6-2.3)  mg/dL


 


Total Bilirubin     (0.2-1.3)  mg/dL


 


AST     (14-36)  U/L


 


ALT     (4-34)  U/L


 


Alkaline Phosphatase     ()  U/L


 


Troponin I   <0.012   (0.000-0.034)  ng/mL


 


NT-Pro-B Natriuret Pep    118  pg/mL


 


Total Protein     (6.3-8.2)  g/dL


 


Albumin     (3.5-5.0)  g/dL


 


Urine Color     


 


Urine Appearance     (Clear)  


 


Urine pH     (5.0-8.0)  


 


Ur Specific Gravity     (1.001-1.035)  


 


Urine Protein     (Negative)  


 


Urine Glucose (UA)     (Negative)  


 


Urine Ketones     (Negative)  


 


Urine Blood     (Negative)  


 


Urine Nitrite     (Negative)  


 


Urine Bilirubin     (Negative)  


 


Urine Urobilinogen     (<2.0)  mg/dL


 


Ur Leukocyte Esterase     (Negative)  


 


Urine RBC     (0-5)  /hpf


 


Urine WBC     (0-5)  /hpf


 


Ur Squamous Epith Cells     (0-4)  /hpf


 


Urine Bacteria     (None)  /hpf


 


Urine Mucus     (None)  /hpf














  07/28/21 Range/Units





  17:29 


 


WBC   (3.8-10.6)  k/uL


 


RBC   (3.80-5.40)  m/uL


 


Hgb   (11.4-16.0)  gm/dL


 


Hct   (34.0-46.0)  %


 


MCV   (80.0-100.0)  fL


 


MCH   (25.0-35.0)  pg


 


MCHC   (31.0-37.0)  g/dL


 


RDW   (11.5-15.5)  %


 


Plt Count   (150-450)  k/uL


 


MPV   


 


Neutrophils %   %


 


Lymphocytes %   %


 


Monocytes %   %


 


Eosinophils %   %


 


Basophils %   %


 


Neutrophils #   (1.3-7.7)  k/uL


 


Lymphocytes #   (1.0-4.8)  k/uL


 


Monocytes #   (0-1.0)  k/uL


 


Eosinophils #   (0-0.7)  k/uL


 


Basophils #   (0-0.2)  k/uL


 


PT   (9.0-12.0)  sec


 


INR   (<1.2)  


 


APTT   (22.0-30.0)  sec


 


Sodium   (137-145)  mmol/L


 


Potassium   (3.5-5.1)  mmol/L


 


Chloride   ()  mmol/L


 


Carbon Dioxide   (22-30)  mmol/L


 


Anion Gap   mmol/L


 


BUN   (7-17)  mg/dL


 


Creatinine   (0.52-1.04)  mg/dL


 


Est GFR (CKD-EPI)AfAm   (>60 ml/min/1.73 sqM)  


 


Est GFR (CKD-EPI)NonAf   (>60 ml/min/1.73 sqM)  


 


Glucose   (74-99)  mg/dL


 


Plasma Lactic Acid Familia   (0.7-2.0)  mmol/L


 


Calcium   (8.4-10.2)  mg/dL


 


Magnesium   (1.6-2.3)  mg/dL


 


Total Bilirubin   (0.2-1.3)  mg/dL


 


AST   (14-36)  U/L


 


ALT   (4-34)  U/L


 


Alkaline Phosphatase   ()  U/L


 


Troponin I   (0.000-0.034)  ng/mL


 


NT-Pro-B Natriuret Pep   pg/mL


 


Total Protein   (6.3-8.2)  g/dL


 


Albumin   (3.5-5.0)  g/dL


 


Urine Color  Dark Yellow  


 


Urine Appearance  Cloudy H  (Clear)  


 


Urine pH  6.5  (5.0-8.0)  


 


Ur Specific Gravity  1.015  (1.001-1.035)  


 


Urine Protein  Negative  (Negative)  


 


Urine Glucose (UA)  Negative  (Negative)  


 


Urine Ketones  2+ H  (Negative)  


 


Urine Blood  Moderate H  (Negative)  


 


Urine Nitrite  Negative  (Negative)  


 


Urine Bilirubin  Negative  (Negative)  


 


Urine Urobilinogen  2.0  (<2.0)  mg/dL


 


Ur Leukocyte Esterase  Negative  (Negative)  


 


Urine RBC  3  (0-5)  /hpf


 


Urine WBC  2  (0-5)  /hpf


 


Ur Squamous Epith Cells  9 H  (0-4)  /hpf


 


Urine Bacteria  Rare H  (None)  /hpf


 


Urine Mucus  Rare H  (None)  /hpf














Disposition


Clinical Impression: 


 POTS (postural orthostatic tachycardia syndrome), Dehydration, Weakness





Disposition: ADMITTED AS IP TO THIS [facility]


Condition: Stable


Referrals: 


Awa Dill MD [Primary Care Provider] - 1-2 days


Decision Date: 07/28/21


Decision Time: 18:44

## 2021-07-28 NOTE — XR
EXAMINATION TYPE: XR chest 2V

 

DATE OF EXAM: 7/28/2021

 

COMPARISON: 6/12/2017

 

HISTORY: Shortness of breath

 

TECHNIQUE:  Frontal and lateral views of the chest are obtained.

 

FINDINGS:

 

Scattered senescent parenchymal changes noted. 

 

No evidence for infiltrate. No evidence for atelectasis.

 

Heart size is stable.

 

Mediastinal structures are stable and grossly unremarkable.

 

No evidence for hilar prominence.

 

Degenerative changes dorsal spine. 

 

IMPRESSION:

1. No evidence for acute pulmonary disease.

## 2021-07-29 VITALS — DIASTOLIC BLOOD PRESSURE: 76 MMHG | TEMPERATURE: 97.8 F | SYSTOLIC BLOOD PRESSURE: 121 MMHG | HEART RATE: 69 BPM

## 2021-07-29 VITALS — RESPIRATION RATE: 16 BRPM

## 2021-07-29 LAB
ANION GAP SERPL CALC-SCNC: 9.1 MMOL/L (ref 4–12)
BUN SERPL-SCNC: 14 MG/DL (ref 9–27)
BUN/CREAT SERPL: 20 RATIO (ref 12–20)
CALCIUM SPEC-MCNC: 8 MG/DL (ref 8.7–10.3)
CHLORIDE SERPL-SCNC: 114 MMOL/L (ref 96–109)
CO2 SERPL-SCNC: 20.9 MMOL/L (ref 21.6–31.8)
GLUCOSE SERPL-MCNC: 159 MG/DL (ref 70–110)
POTASSIUM SERPL-SCNC: 4 MMOL/L (ref 3.5–5.5)
SODIUM SERPL-SCNC: 144 MMOL/L (ref 135–145)

## 2021-07-29 RX ADMIN — BUDESONIDE AND FORMOTEROL FUMARATE DIHYDRATE SCH: 80; 4.5 AEROSOL RESPIRATORY (INHALATION) at 12:08

## 2021-07-29 RX ADMIN — IPRATROPIUM BROMIDE SCH MG: 0.5 SOLUTION RESPIRATORY (INHALATION) at 12:06

## 2021-07-29 RX ADMIN — BUDESONIDE AND FORMOTEROL FUMARATE DIHYDRATE SCH: 80; 4.5 AEROSOL RESPIRATORY (INHALATION) at 08:24

## 2021-07-29 RX ADMIN — CEFAZOLIN SCH MLS/HR: 330 INJECTION, POWDER, FOR SOLUTION INTRAMUSCULAR; INTRAVENOUS at 02:46

## 2021-07-29 RX ADMIN — BUDESONIDE AND FORMOTEROL FUMARATE DIHYDRATE SCH PUFF: 80; 4.5 AEROSOL RESPIRATORY (INHALATION) at 12:07

## 2021-07-29 RX ADMIN — IPRATROPIUM BROMIDE SCH: 0.5 SOLUTION RESPIRATORY (INHALATION) at 16:08

## 2021-07-29 RX ADMIN — BUDESONIDE AND FORMOTEROL FUMARATE DIHYDRATE SCH: 80; 4.5 AEROSOL RESPIRATORY (INHALATION) at 08:22

## 2021-07-29 RX ADMIN — IPRATROPIUM BROMIDE SCH: 0.5 SOLUTION RESPIRATORY (INHALATION) at 08:24

## 2021-07-29 NOTE — P.CRDCN
History of Present Illness


Consult date: 07/29/21


History of present illness: 





HISTORY OF PRESENT ILLNESS:  





This is a 65-year-old female with a past medical history significant for POTS, 

nonischemic cardiomyopathy with AICD implantation, paroxysmal atrial 

fibrillation on Eliquis, hypertension, hyperlipidemia, and congestive heart 

failure. Patient follows with a Dr. Spencer in [place]. We have been 

asked to see the patient in consultation for chest pain. Patient examined at the

bedside.  Patient states she usually has episodes of her POTS 1-2 times a week. 

She states she can usually feel them coming on and she can get them to pass on 

their own by just relaxing. However, she reports recently they are becoming more

frequent and lastly longer over the past two months. She said yesterday, she was

sitting curling her hair when she began to have tingling in both of her arms. 

She also reports tingling in her chest. She denied having any chest pain or 

pressure. She reports feeling diaphoretic.  She states this episode lasted 

approximately 90 minutes and she was still feeling unwell when she came to the 

hospital.  At the time of examination, the patient states she is feeling back to

her baseline.  She denies any dizziness or lightheadedness.  She denies any 

tingling in her extremities.  She reports that her chest feels sore this morning

and does reports mild discomfort when she takes a deep breath.





Patient underwent a cardiac catheterization in 2017 revealing normal coronary 

arteries with an ejection fraction of 35-40%.  Patient does report her ejection 

fraction has been as low as 20%.  She states she was started on Entresto and her

last known EF was around 40%.  She reports she had a stress test performed about

a year ago through her primary cardiologist.





EKG reveals mechanism.  Left anterior fascicular block.  Prolonged QT.


Chest xray negative for acute process


Laboratory data: WBC 6.4.  Hemoglobin 13.5.  Platelet count 214.  Sodium 140.  

Potassium 3.9.  BUN 15.  Creatinine 0.16.  Lactic acid 2.0.  Magnesium 1.9.  

Troponin negative 3.  .


Current home cardiac medications include metoprolol succinate 100 mg daily, 

Eliquis 5 mg twice a day, Entresto 97-103mg BID





REVIEW OF SYSTEMS: 


At the time of my exam:


CONSTITUTIONAL: Denies fever or chills.


HEENT: Denies blurred vision, vision changes, or eye pain. Denies hemoptysis 


CARDIOVASCULAR: Denies chest pain. Denies orthopnea. Denies PND. Denies 

palpitations


RESPIRATORY: Denies shortness of breath. 


GASTROINTESTINAL: Denies abdominal pain. Denies nausea or vomiting. 


HEMATOLOGIC: Denies bleeding disorders.


GENITOURINARY:  Denies any blood in urine.


SKIN: Denies pruitis. Denies rash.





PHYSICAL EXAM: 


VITAL SIGNS: Reviewed.


GENERAL: Well-developed in no acute distress. 


HEENT: Head is normocephalic. Pupils are equal, round. Sclerae anicteric. Mucous

membranes of the mouth are moist. Neck supple. No JVD or thyromegaly


LUNGS: Respirations even and unlabored. Lungs essentially clear to auscultation 

bilaterally.


HEART: Regular rate and rhythm.  S1 and S2 heard.


ABDOMEN: Soft. Nondistended. Nontender.


EXTREMITIES: Normal range of motion.  No clubbing or cyanosis.  Peripheral 

pulses intact.  No lower extremity edema


NEUROLOGIC: Awake and alert. Oriented x 3. 





ASSESSMENT: 


Bilateral arm tingling


POTS


Nonischemic cardiomyopathy with AICD implantation


Proximal atrial fibrillation on Eliquis


Hypertension


Hyperlipidemia


Chronic systolic congestive heart failure, currently euvolemic





PLAN: 


Obtain 2-D echo to assess cardiac structure and function


Obtain records from patient's primary cardiologist


Interrogate AICD


Continue telemetry monitoring


Further recommendations pending patient course


Patient may be discharged home this afternoon from a cardiac standpoint. She was

encouraged to follow up with her primary cardiologist within the next week.





Nurse practitioner note has been reviewed by physician. Signing provider agrees 

with the documented findings, assessment, and plan of care. 








Past Medical History


Past Medical History: Asthma, Cancer, Fibromyalgia, Hyperlipidemia, 

Hypertension, Osteoarthritis (OA), Thyroid Disorder


Additional Past Medical History / Comment(s): POTS,MTHFR mutation, hx melanoma, 

Defibillator, exercise-induced asthma.


History of Any Multi-Drug Resistant Organisms: None Reported


Past Surgical History: AICD, Back Surgery, Heart Catheterization, Hysterectomy, 

Joint Replacement, Orthopedic Surgery


Additional Past Surgical History / Comment(s): bilateral knee replacement, and 

bilateral shoulders, joint rt thumb replaced and revised.  Heart cath 15 years 

ago with no stents. Right knee revision in 2016. Back surgery 5 years ago. 

Previous pain injections.


Past Anesthesia/Blood Transfusion Reactions: Previous Problems w/ Anesthesia, 

Postoperative Nausea & Vomiting (PONV)


Additional Past Anesthesia/Blood Transfusion Reaction / Comment(s): POTS episode

during anesthesia (severe drop in BP after previous knee replacement surgery)


Type of Cardiac Device: AICD


Device Placement Date:: 6/2016


Past Psychological History: Depression


Past Alcohol Use History: Occasional


Past Drug Use History: None Reported





- Past Family History


  ** Sister(s)


Family Medical History: Cancer





  ** Brother(s)


Family Medical History: Cancer, Hypertension





Medications and Allergies


                                Home Medications











 Medication  Instructions  Recorded  Confirmed  Type


 


ALPRAZolam [Xanax] 0.25 mg PO BID PRN 05/02/16 07/28/21 History


 


Cyclobenzaprine [Flexeril] 5 mg PO TID PRN 05/02/16 07/28/21 History


 


Nabumetone [Relafen] 1,500 mg PO DAILY 05/02/16 07/28/21 History


 


Liothyronine Sodium [Cytomel] 10 mcg PO DAILY 08/31/16 07/28/21 History


 


Multivitamins, Thera [Multivitamin 1 tab PO DAILY 08/31/16 07/28/21 History





(formulary)]    


 


Rizatriptan Odt [Maxalt MLT] 10 mg PO DAILY PRN 09/12/16 07/28/21 History


 


HYDROcodone/APAP 10-325MG [Norco 1 tab PO Q6H PRN 01/03/17 07/28/21 History





]    


 


Levothyroxine Sodium [Synthroid] 50 mcg PO MOTUTHSA 02/20/19 07/28/21 History


 


Sacubitril/Valsartan [Entresto 97 1 tab PO BID 02/20/19 07/28/21 History





mg-103 mg Tablet]    


 


Sertraline [Zoloft] 150 mg PO DAILY 02/20/19 07/28/21 History


 


Apixaban [Eliquis] 5 mg PO BID 07/28/21 07/28/21 History


 


Cannabis Chewable 30 mg PO BID PRN 07/28/21 07/28/21 History


 


Fluticasone/Umeclidin/Vilanter 1 puff INHALATION RT-DAILY 07/28/21 07/28/21 

History





[Trelegy Ellipta 200-62.5-25]    


 


Levothyroxine Sodium [Synthroid] 75 mcg PO SUWEFR 07/28/21 07/28/21 History


 


Metoprolol Succinate [Toprol XL] 100 mg PO DAILY 07/28/21 07/28/21 History


 


Omeprazole 20 mg PO DAILY 07/28/21 07/28/21 History


 


methylPREDNISolone Dose Pack See Taper PO AS DIRECTED 07/28/21 07/28/21 History





[Medrol Dose Pack]    








                                    Allergies











Allergy/AdvReac Type Severity Reaction Status Date / Time


 


codeine Allergy  Nausea & Verified 07/28/21 15:18





   Vomiting  














Physical Exam


Vitals: 


                                   Vital Signs











  Temp Pulse Pulse Pulse Pulse Pulse Resp


 


 07/29/21 07:00  97.7 F   78     16


 


 07/29/21 01:47    75    


 


 07/29/21 01:00  97.6 F   75     17


 


 07/28/21 23:17    69     16


 


 07/28/21 22:55  97.8 F      67  16


 


 07/28/21 22:27   77      18


 


 07/28/21 19:55   74      18


 


 07/28/21 18:30     69  72  68 


 


 07/28/21 17:00   66      18


 


 07/28/21 15:32  97.3 F L      


 


 07/28/21 15:30   67      18


 


 07/28/21 15:11       


 


 07/28/21 15:07   71      18














  BP BP BP BP BP Pulse Ox


 


 07/29/21 07:00   113/56     98


 


 07/29/21 01:47      


 


 07/29/21 01:00      109/70  99


 


 07/28/21 23:17      


 


 07/28/21 22:55      135/76  100


 


 07/28/21 22:27  106/66      98


 


 07/28/21 19:55  105/56      100


 


 07/28/21 18:30    119/71  132/91  128/62 


 


 07/28/21 17:00  109/60      100


 


 07/28/21 15:32      


 


 07/28/21 15:30  104/58      98


 


 07/28/21 15:11       100


 


 07/28/21 15:07  104/58      100








                                Intake and Output











 07/28/21 07/29/21 07/29/21





 22:59 06:59 14:59


 


Other:   


 


  Voiding Method  Toilet 


 


  # Voids  2 


 


  Weight 94.801 kg  














Results





                                 07/28/21 15:32





                                 07/28/21 15:32


                                 Cardiac Enzymes











  07/28/21 07/28/21 07/28/21 Range/Units





  15:32 15:32 21:01 


 


AST  34    (14-36)  U/L


 


Troponin I   <0.012  <0.012  (0.000-0.034)  ng/mL














  07/29/21 Range/Units





  01:06 


 


AST   (14-36)  U/L


 


Troponin I  <0.012  (0.000-0.034)  ng/mL








                                   Coagulation











  07/28/21 Range/Units





  15:32 


 


PT  10.8  (9.0-12.0)  sec


 


APTT  25.9  (22.0-30.0)  sec








                                       CBC











  07/28/21 Range/Units





  15:32 


 


WBC  6.4  (3.8-10.6)  k/uL


 


RBC  4.40  (3.80-5.40)  m/uL


 


Hgb  13.5  (11.4-16.0)  gm/dL


 


Hct  40.5  (34.0-46.0)  %


 


Plt Count  314  (150-450)  k/uL








                          Comprehensive Metabolic Panel











  07/28/21 Range/Units





  15:32 


 


Sodium  140  (137-145)  mmol/L


 


Potassium  3.9  (3.5-5.1)  mmol/L


 


Chloride  109 H  ()  mmol/L


 


Carbon Dioxide  19 L  (22-30)  mmol/L


 


BUN  15  (7-17)  mg/dL


 


Creatinine  0.61  (0.52-1.04)  mg/dL


 


Glucose  107 H  (74-99)  mg/dL


 


Calcium  9.1  (8.4-10.2)  mg/dL


 


AST  34  (14-36)  U/L


 


ALT  18  (4-34)  U/L


 


Alkaline Phosphatase  67  ()  U/L


 


Total Protein  6.5  (6.3-8.2)  g/dL


 


Albumin  4.2  (3.5-5.0)  g/dL








                               Current Medications











Generic Name Dose Route Start Last Admin





  Trade Name Freq  PRN Reason Stop Dose Admin


 


Alprazolam  0.25 mg  07/29/21 00:16 





  Alprazolam 0.25 Mg Tab  PO  





  BID PRN  





  Anxiety  


 


Apixaban  5 mg  07/29/21 09:00 





  Apixaban 5 Mg Tab  PO  





  BID [facility]  





  Protocol  


 


Aspirin  81 mg  07/29/21 09:00 





  Aspirin 81 Mg  PO  





  DAILY [facility]  


 


Budesonide/Formoterol Fumarate  2 puff  07/29/21 08:00  07/29/21 08:24





  Symbicort 80-4.5 Mcg Inhaler  INHALATION   Not Given





  RT-BID [facility]  


 


Sodium Chloride  1,000 mls @ 130 mls/hr  07/28/21 18:45  07/29/21 02:46





  Saline 0.9%  IV   130 mls/hr





  .Q7H42M VEE   Administration


 


Ipratropium Bromide  0.5 mg  07/29/21 08:00 





  Ipratropium 0.5 Mg/2.5 Ml Nebu  INHALATION  





  RT-QID [facility]  


 


Levothyroxine Sodium  50 mcg  07/29/21 06:30  07/29/21 05:37





  Levothyroxine 50 Mcg Tab  PO   50 mcg





  [email or web address] [facility]   Administration


 


Levothyroxine Sodium  75 mcg  07/30/21 06:30 





  Levothyroxine 50 Mcg Tab  PO  





  [email or web address] [facility]  


 


Metoprolol Succinate  100 mg  07/29/21 09:00 





  Metoprolol Succinate (Er) 100 Mg Tab.Er.24h  PO  





  DAILY [facility]  


 


Multivitamins  1 each  07/29/21 09:00 





  Multivitamins, Thera 1 Each Tab  PO  





  DAILY [facility]  


 


Naloxone HCl  0.2 mg  07/28/21 18:38 





  Naloxone 0.4 Mg/Ml 1 Ml Vial  IV  





  Q2M PRN  





  Opioid Reversal  


 


Ondansetron HCl  4 mg  07/28/21 18:38 





  Ondansetron 4 Mg/2 Ml Vial  IVP  





  Q8HR PRN  





  Nausea And Vomiting  


 


Sacubitril/Valsartan  1 each  07/29/21 09:00 





  Sacubitril/Valsartan 97 Mg-103 Mg Tablet  PO  





  BID [facility]  


 


Sertraline HCl  150 mg  07/29/21 09:00 





  Sertraline 50 Mg Tab  PO  





  DAILY [facility]  








                                Intake and Output











 07/28/21 07/29/21 07/29/21





 22:59 06:59 14:59


 


Other:   


 


  Voiding Method  Toilet 


 


  # Voids  2 


 


  Weight 94.801 kg  








                                        





                                 07/28/21 15:32 





                                 07/28/21 15:32

## 2021-07-29 NOTE — ECHOF
Referral Reason:hx of POTS, weakness



MEASUREMENTS

--------

HEIGHT: 162.6 cm

WEIGHT: 94.8 kg

BP: 109/70

RVIDd:   3.3 cm     (< 3.3)

IVSd:   1.2 cm     (0.6 - 1.1)

LVIDd:   5.0 cm     (3.9 - 5.3)

LVPWd:   1.2 cm     (0.6 - 1.1)

IVSs:   1.4 cm

LVIDs:   3.7 cm

LVPWs:   1.5 cm

LAESV Index (A-L):   36.71 ml/m

Ao Diam:   3.6 cm     (2.0 - 3.7)

AV Cusp:   1.6 cm     (1.5 - 2.6)

LA Diam:   3.1 cm     (2.7 - 3.8)

MV EXCURSION:   17.009 mm     (> 18.000)

MV EF SLOPE:   59 mm/s     (70 - 150)

EPSS:   0.9 cm

MV E Efrain:   1.12 m/s

MV DecT:   208 ms

MV A Efrain:   1.31 m/s

MV E/A Ratio:   0.86 

RAP:   5.00 mmHg

RVSP:   41.69 mmHg







FINDINGS

--------

Sinus rhythm.

This was a technically adequate study.

The left ventricular size is normal.   There is mild concentric left ventricular hypertrophy.   Overa
ll left ventricular systolic function is normal with, an EF between 55 - 60 %.   The diastolic fillin
g pattern is normal for the age of the patient 12.81.

The right ventricle is mildly enlarged.

LA is moderately dilated 34-39 ml/m2

The right atrial size is normal.   Electronic pacemaker lead seen in the right atrial cavity.

Interatrial and interventricular septum intact.

The aortic valve is trileaflet and appears structurally normal.   There is no evidence of aortic regu
rgitation.   There is no evidence of aortic stenosis.

Moderate mitral regurgitation is present.

Mild-to-moderate tricuspid regurgitation present.   There is mild to moderate pulmonary hypertension.
   The right ventricular systolic pressure, as measured by Doppler, is 41.69mmHg.

There is no pulmonic regurgitation present.

The aortic root size is normal.

IVC Not well visulized.

There is no pericardial effusion.



CONCLUSIONS

--------

1. The left ventricular size is normal.

2. There is mild concentric left ventricular hypertrophy.

3. Overall left ventricular systolic function is normal with, an EF between 55 - 60 %.

4. The diastolic filling pattern is normal for the age of the patient 12.81

5. The right ventricle is mildly enlarged.

6. LA is moderately dilated 34-39 ml/m2

7. Moderate mitral regurgitation is present.

8. Mild-to-moderate tricuspid regurgitation present.

9. There is mild to moderate pulmonary hypertension.

10. The right ventricular systolic pressure, as measured by Doppler, is 41.69mmHg.





SONOGRAPHER: Rachel Zhao RDCS

## 2021-07-29 NOTE — P.HPIM
History of Present Illness


H&P Date: 07/28/21





The patient is a 65-year-old female with a PMH of POTS, status post AICD 

placement, paroxysmal A. fib on Eliquis, hypertension, hyperlipidemia, 

hypothyroidism, and asthma presented to the emergency room with complaints of 

chest discomfort, shortness of breath, lightheadedness, and weakness.  The pat

ient reports that over the past 2 or 3 months, her typical POTS episodes have 

been worsening, now lasting longer and are associated with dizziness and 

palpitations.  The patient reports that her most recent episode was this 

afternoon at around 2 PM when she tried to stand up, and experienced substernal 

chest discomfort with associated shortness of breath, and lightheadedness.  

Patient notes that previously her symptoms would resolve within 1-2 minutes but 

this time continued for 20 minutes, at which time she became alarmed and 

activated EMS.  She denied experiencing loss of consciousness or falls.  She 

reports that the chest discomfort is somewhat achy in nature, 4 out of 10 at 

maximal intensity, substernal, nonradiating, with associated lightheadedness.  

She also reports tingling of her bilateral hands and legs.  She also reports 

feeling worn down which she states is typical of her episodes. Denied cough, 

fever, chills.  Denied abdominal pain, nausea, vomiting.  At time of interview, 

she reports that her chest pain had improved to a 2 out of 10 and she had no 

additional complaints. Upon presentation to the emergency room, her vitals were 

/58, pulse 71, respiratory rate 18, and SpO2 100% on room air.  EKG in the

emergency room revealed a normal sinus rhythm at 76 bpm with left anterior 

fascicular block with a prolonged QTC at 517 ms.  Chest x-ray was unremarkable. 

Laboratory evaluation was reviewed and was remarkable for CO2 of 19, lactic acid

2.0, troponin less than 0.012, proBNP 118.





Review of systems:


Pertinent positives and negatives as discussed in HPI, a complete review of 

systems was performed and all other systems are negative.





Physical examination:


General: non toxic, no distress, appears at stated age, obese


Derm: no unusual rashes/lesions no unusual ecchymoses, warm, dry


Head: atraumatic, normocephalic, symmetric


Eyes: EOMI, no lid lag, anicteric sclera, pupils equal round reactive to light


ENT: Nose and ears atraumatic, no thrush,  no pharyngeal erythema


Neck: No thyromegaly, no cervical lymphadenopathy, trachea midline, supple


Mouth: no lip lesion, mucus membranes moist


Cardiovascular: S1S2 reg, no murmur, positive posterior tibial pulse bilateral, 

no edema, capillary refill less than 2 seconds


Lungs: CTA bilateral, no rhonchi, no rales , no accessory muscle use


Abdominal: soft,  nontender to palpation, no guarding, no appreciable organ

omegaly, normal bowel sounds


Ext: no gross muscle atrophy,  muscle strength 5 out of 5 in all 4 extremities 

grossly, no contractures, 


Neuro:  CN II-XI grossly intact, light touch intact all 4 extremities, finger to

nose within normal limits,


Psych: Alert, oriented, appropriate affect 





Assessment/plan





Chest pain, rule out ACS


-Trend troponin


-Cardiac monitoring


-Cardiology consult


-Continue with aspirin





Prolonged QTC


-Avoid QT prolonging agents





Chronic conditions: Hypertension, hyperlipidemia, hypothyroidism, A. fib 

paroxysmal, s/p AICD placement


-Continue home meds





DVT prophylaxis


-Eliquis





The patient is admitted with an anticipated less than 2 midnight stay for 

evaluation of chest pain


CODE STATUS: Full Code


Discussed with: Patient


Anticipated discharge date: in am


Anticipated discharge place: Home








Past Medical History


Past Medical History: Asthma, Cancer, Fibromyalgia, Hyperlipidemia, 

Hypertension, Osteoarthritis (OA), Thyroid Disorder


Additional Past Medical History / Comment(s): POTS,MTHFR mutation, hx melanoma, 

Defibillator, exercise-induced asthma.


History of Any Multi-Drug Resistant Organisms: None Reported


Past Surgical History: AICD, Back Surgery, Heart Catheterization, Hysterectomy, 

Joint Replacement, Orthopedic Surgery


Additional Past Surgical History / Comment(s): bilateral knee replacement, and 

bilateral shoulders, joint rt thumb replaced and revised.  Heart cath 15 years 

ago with no stents. Right knee revision in 2016. Back surgery 5 years ago. 

Previous pain injections.


Past Anesthesia/Blood Transfusion Reactions: Previous Problems w/ Anesthesia, 

Postoperative Nausea & Vomiting (PONV)


Additional Past Anesthesia/Blood Transfusion Reaction / Comment(s): POTS episode

during anesthesia (severe drop in BP after previous knee replacement surgery)


Type of Cardiac Device: AICD


Device Placement Date:: 6/2016


Past Psychological History: Depression


Past Alcohol Use History: Occasional


Past Drug Use History: None Reported





- Past Family History


  ** Sister(s)


Family Medical History: Cancer





  ** Brother(s)


Family Medical History: Cancer, Hypertension





Medications and Allergies


                                Home Medications











 Medication  Instructions  Recorded  Confirmed  Type


 


ALPRAZolam [Xanax] 0.25 mg PO BID PRN 05/02/16 07/28/21 History


 


Cyclobenzaprine [Flexeril] 5 mg PO TID PRN 05/02/16 07/28/21 History


 


Nabumetone [Relafen] 1,500 mg PO DAILY 05/02/16 07/28/21 History


 


Liothyronine Sodium [Cytomel] 10 mcg PO DAILY 08/31/16 07/28/21 History


 


Multivitamins, Thera [Multivitamin 1 tab PO DAILY 08/31/16 07/28/21 History





(formulary)]    


 


Rizatriptan Odt [Maxalt MLT] 10 mg PO DAILY PRN 09/12/16 07/28/21 History


 


HYDROcodone/APAP 10-325MG [Norco 1 tab PO Q6H PRN 01/03/17 07/28/21 History





]    


 


Levothyroxine Sodium [Synthroid] 50 mcg PO MOTUTHSA 02/20/19 07/28/21 History


 


Sacubitril/Valsartan [Entresto 97 1 tab PO BID 02/20/19 07/28/21 History





mg-103 mg Tablet]    


 


Sertraline [Zoloft] 150 mg PO DAILY 02/20/19 07/28/21 History


 


Apixaban [Eliquis] 5 mg PO BID 07/28/21 07/28/21 History


 


Cannabis Chewable 30 mg PO BID PRN 07/28/21 07/28/21 History


 


Fluticasone/Umeclidin/Vilanter 1 puff INHALATION RT-DAILY 07/28/21 07/28/21 

History





[Trelegy Ellipta 200-62.5-25]    


 


Levothyroxine Sodium [Synthroid] 75 mcg PO SUWEFR 07/28/21 07/28/21 History


 


Metoprolol Succinate [Toprol XL] 100 mg PO DAILY 07/28/21 07/28/21 History


 


Omeprazole 20 mg PO DAILY 07/28/21 07/28/21 History


 


methylPREDNISolone Dose Pack See Taper PO AS DIRECTED 07/28/21 07/28/21 History





[Medrol Dose Pack]    








                                    Allergies











Allergy/AdvReac Type Severity Reaction Status Date / Time


 


codeine Allergy  Nausea & Verified 07/28/21 15:18





   Vomiting  














Physical Exam


Vitals: 


                                   Vital Signs











  Temp Pulse Pulse Pulse Pulse Resp BP


 


 07/28/21 19:55       74 H  105/56


 


 07/28/21 18:30    69  72  68  


 


 07/28/21 17:00   66     18  109/60


 


 07/28/21 15:32  97.3 F L      


 


 07/28/21 15:30   67     18  104/58


 


 07/28/21 15:11       


 


 07/28/21 15:07   71     18  104/58














  BP BP BP Pulse Ox


 


 07/28/21 19:55     100


 


 07/28/21 18:30  119/71  132/91  128/62 


 


 07/28/21 17:00     100


 


 07/28/21 15:32    


 


 07/28/21 15:30     98


 


 07/28/21 15:11     100


 


 07/28/21 15:07     100








                                Intake and Output











 07/28/21 07/28/21 07/28/21





 06:59 14:59 22:59


 


Other:   


 


  Weight   94.801 kg














Results


CBC & Chem 7: 


                                 07/28/21 15:32





                                 07/28/21 15:32


Labs: 


                  Abnormal Lab Results - Last 24 Hours (Table)











  07/28/21 07/28/21 Range/Units





  15:32 17:29 


 


Chloride  109 H   ()  mmol/L


 


Carbon Dioxide  19 L   (22-30)  mmol/L


 


Glucose  107 H   (74-99)  mg/dL


 


Urine Appearance   Cloudy H  (Clear)  


 


Urine Ketones   2+ H  (Negative)  


 


Urine Blood   Moderate H  (Negative)  


 


Ur Squamous Epith Cells   9 H  (0-4)  /hpf


 


Urine Bacteria   Rare H  (None)  /hpf


 


Urine Mucus   Rare H  (None)  /hpf

## 2021-07-29 NOTE — P.DS
Providers


Date of admission: 


07/28/21 18:40





Expected date of discharge: 07/29/21


Attending physician: 


Mirian Shepherd DO





Primary care physician: 


Awa Dill MD





Hospital Course: 


Discharge Diagnosis:


Atypical chest pain


Prolonged QTC


POTS


Hypertension


Dyslipidemia


Hypothyroidism


A. fib





Hospital Course: 


Patient is a 65-year-old female history of Pott's, cardiomyopathy, paroxysmal A.

fib, hypothyroidism, and dyslipidemia who presented to the emergency department 

with complaints of chest discomfort, shortness breath, lightheadedness, and 

weakness.  She was initially admitted for chest pain observation.  She was found

to have a QTC of 517 on admission, troponin was normal, EKG revealed anterior 

fascicular block but no significant ST segment changes.  She was admitted and 

seen by cardiology.  Her troponins remained negative.  Echocardiogram showed 

preserved ejection fraction 55-65 and mild to moderate MR and TR.  AICD was 

interrogated without any signs of dysfunction.  She was determined stable for 

discharge.





Follow-up: Primary care provider in 1-2 days, primary cardiologist in 1 week, 

could consider referral to [facility] for worsening POTS symptoms. 








Patient seen and examined at bedside.  No chest discomfort, shortness of breath,

or tingling feeling.  She states her popsicles been progressing over the last 

several months.  We discussed possible follow-up with [facility] following

with her primary cardiologist first.





Vital signs reviewed and stable. 


General: non toxic, no distress, appears at stated age


Derm: warm, dry


Head: atraumatic, normocephalic, symmetric


Eyes: EOMI, no lid lag, anicteric sclera


Mouth: no lip lesion, mucus membranes moist


Cardiovascular: S1S2 reg, no murmur, positive posterior tibial pulse bilateral, 


Lungs: CTA bilateral, no rhonchi, no rales , no accessory muscle use


Abdominal: soft,  nontender to palpation, no guarding, no appreciable 

organomegaly


Ext: no gross muscle atrophy, no edema, no contractures


Neuro:  CN II-XI grossly intact, no focal neuro deficits


Psych: Alert, oriented, appropriate affect 








A total of 25 minutes of time were spent preparing this complex discharge 

summary .





Patient Condition at Discharge: Stable





Plan - Discharge Summary


Discharge Rx Participant: No


New Discharge Prescriptions: 


New


   Aspirin 81 mg PO DAILY  chew





Continue


   Cyclobenzaprine [Flexeril] 5 mg PO TID PRN


     PRN Reason: Muscle Pain


   ALPRAZolam [Xanax] 0.25 mg PO BID PRN


     PRN Reason: Anxiety


   Nabumetone [Relafen] 1,500 mg PO DAILY


   Liothyronine Sodium [Cytomel] 10 mcg PO DAILY


   Multivitamins, Thera [Multivitamin (formulary)] 1 tab PO DAILY


   Rizatriptan Odt [Maxalt MLT] 10 mg PO DAILY PRN


     PRN Reason: Migraine Headache


   HYDROcodone/APAP 10-325MG [Norco ] 1 tab PO Q6H PRN


     PRN Reason: Pain


   Levothyroxine Sodium [Synthroid] 50 mcg PO MOTUTHSA


   Sertraline [Zoloft] 150 mg PO DAILY


   Sacubitril/Valsartan [Entresto 97 mg-103 mg Tablet] 1 tab PO BID


   Metoprolol Succinate [Toprol XL] 100 mg PO DAILY


   Levothyroxine Sodium [Synthroid] 75 mcg PO SUWEFR


   Cannabis Chewable 30 mg PO BID PRN


     PRN Reason: Pain


   Omeprazole 20 mg PO DAILY


   Fluticasone/Umeclidin/Vilanter [Trelegy Ellipta 200-62.5-25] 1 puff 

INHALATION RT-DAILY


   Apixaban [Eliquis] 5 mg PO BID





Discontinued


   methylPREDNISolone Dose Pack [Medrol Dose Pack] See Taper PO AS DIRECTED


Discharge Medication List





ALPRAZolam [Xanax] 0.25 mg PO BID PRN 05/02/16 [History]


Cyclobenzaprine [Flexeril] 5 mg PO TID PRN 05/02/16 [History]


Nabumetone [Relafen] 1,500 mg PO DAILY 05/02/16 [History]


Liothyronine Sodium [Cytomel] 10 mcg PO DAILY 08/31/16 [History]


Multivitamins, Thera [Multivitamin (formulary)] 1 tab PO DAILY 08/31/16 

[History]


Rizatriptan Odt [Maxalt MLT] 10 mg PO DAILY PRN 09/12/16 [History]


HYDROcodone/APAP 10-325MG [Norco ] 1 tab PO Q6H PRN 01/03/17 [History]


Levothyroxine Sodium [Synthroid] 50 mcg PO MOTUTHSA 02/20/19 [History]


Sacubitril/Valsartan [Entresto 97 mg-103 mg Tablet] 1 tab PO BID 02/20/19 

[History]


Sertraline [Zoloft] 150 mg PO DAILY 02/20/19 [History]


Apixaban [Eliquis] 5 mg PO BID 07/28/21 [History]


Cannabis Chewable 30 mg PO BID PRN 07/28/21 [History]


Fluticasone/Umeclidin/Vilanter [Trelegy Ellipta 200-62.5-25] 1 puff INHALATION 

RT-DAILY 07/28/21 [History]


Levothyroxine Sodium [Synthroid] 75 mcg PO SUWEFR 07/28/21 [History]


Metoprolol Succinate [Toprol XL] 100 mg PO DAILY 07/28/21 [History]


Omeprazole 20 mg PO DAILY 07/28/21 [History]


Aspirin 81 mg PO DAILY  chew 07/29/21 [Rx]








Follow up Appointment(s)/Referral(s): 


Awa Dill MD [Primary Care Provider] - 1-2 days


Activity/Diet/Wound Care/Special Instructions: 


Activity:


as tolerated





Diet: 


heart healthy





Special Instructions: 


Follow-up with your cardiologist 


 

[email or web address]


[email or web address]


Discharge Disposition: HOME SELF-CARE

## 2021-08-19 ENCOUNTER — HOSPITAL ENCOUNTER (OUTPATIENT)
Dept: HOSPITAL 47 - RADCTMAIN | Age: 66
Discharge: HOME | End: 2021-08-19
Attending: INTERNAL MEDICINE
Payer: MEDICARE

## 2021-08-19 DIAGNOSIS — R06.00: Primary | ICD-10-CM

## 2021-08-19 LAB — BUN SERPL-SCNC: 14 MG/DL (ref 7–17)

## 2021-08-19 PROCEDURE — 36415 COLL VENOUS BLD VENIPUNCTURE: CPT

## 2021-08-19 PROCEDURE — 71260 CT THORAX DX C+: CPT

## 2021-08-19 PROCEDURE — 82565 ASSAY OF CREATININE: CPT

## 2021-08-19 PROCEDURE — 84520 ASSAY OF UREA NITROGEN: CPT

## 2021-08-19 NOTE — CT
EXAMINATION TYPE: CT chest w con

 

DATE OF EXAM: 8/19/2021

 

COMPARISON: 10/15/2012

 

HISTORY: Dyspnea.

 

CT DLP: 550 mGycm

Automated exposure control for dose reduction was used.

 

CONTRAST: 

CT scan of the chest is performed with IV Contrast, patient injected with 100ml mL of Isovue 300.

 

FINDINGS:

 

LUNGS: The lungs are grossly clear, there is no concerning parenchymal mass or nodule identified.   T
here is no pleural effusion or pneumothorax seen.  The tracheobronchial tree is patent.

 

MEDIASTINUM: There are no greater than 1 cm hilar or mediastinal lymph nodes.   No pericardial effusi
on is seen.  Thoracic aorta is of normal caliber. The heart is not enlarged.

 

UPPER ABDOMEN: Large fixed hiatal hernia identified.

 

OTHER:  No additional significant abnormality is seen.

 

IMPRESSION:

 

1. No evidence for infiltrate nodule or volume loss. No pleural effusion seen. Large fixed hiatal her
wandy

## 2021-12-28 ENCOUNTER — HOSPITAL ENCOUNTER (OUTPATIENT)
Dept: HOSPITAL 47 - LABWHC1 | Age: 66
Discharge: HOME | End: 2021-12-28
Attending: INTERNAL MEDICINE
Payer: MEDICARE

## 2021-12-28 DIAGNOSIS — R53.83: ICD-10-CM

## 2021-12-28 DIAGNOSIS — E03.8: Primary | ICD-10-CM

## 2021-12-28 LAB — VIT B12 SERPL-MCNC: 871 PG/ML (ref 200–944)

## 2021-12-28 PROCEDURE — 36415 COLL VENOUS BLD VENIPUNCTURE: CPT

## 2021-12-28 PROCEDURE — 84146 ASSAY OF PROLACTIN: CPT

## 2021-12-28 PROCEDURE — 82024 ASSAY OF ACTH: CPT

## 2021-12-28 PROCEDURE — 82533 TOTAL CORTISOL: CPT

## 2021-12-28 PROCEDURE — 84443 ASSAY THYROID STIM HORMONE: CPT

## 2021-12-28 PROCEDURE — 82607 VITAMIN B-12: CPT

## 2022-03-25 NOTE — FL
EXAMINATION TYPE: FL guided pain mgmt statistic

 

DATE OF EXAM: 3/7/2019

 

FLUOROSCOPY

 

Fluoroscopy time of seconds was used during bilaterally lumbar facet radiofrequency ablation.  2 imag
e/s document/s the procedure. Fawad Garcia is an extremely pleasant 84 year old year old male patient here today for skin check. He a few rough areas on neck. No pain or bleeding skin lesions.  Patient has no other skin complaints today.  Remainder of the HPI, Meds, PMH, Allergies, FH, and SH was reviewed in chart.    Pertinent Hx:   History of SCC  Past Medical History:   Diagnosis Date     Diabetes mellitus (H)      Hypertension      Squamous cell carcinoma        Past Surgical History:   Procedure Laterality Date     AMPUTATE TOE(S) Right 7/23/2019    Procedure: Partial amputation great toe;  Surgeon: Ethan Montesinos DPM;  Location: [facility] OR     AMPUTATE TOE(S) Left 12/24/2019    Procedure: Amputation of the great toe, left foot;  Surgeon: Ethan Montesinos DPM;  Location: [facility]     AMPUTATE TOE(S) Right 3/3/2020    Procedure: Partial amputation, second toe, right foot;  Surgeon: Ethan Montesinos DPM;  Location: [facility] OR     [facility] SURGICAL PATHOLOGY  2002    prostate biopsy elevated PSA     COLONOSCOPY       HC REMOVE TONSILS/ADENOIDS,<11 Y/O      T & A     IR LOWER EXTREMITY ANGIOGRAM LEFT  2/26/2020     IR LOWER EXTREMITY ANGIOGRAM RIGHT  12/11/2018     PHACOEMULSIFICATION WITH STANDARD INTRAOCULAR LENS IMPLANT Left 4/2/2015    Procedure: PHACOEMULSIFICATION WITH STANDARD INTRAOCULAR LENS IMPLANT;  Surgeon: Joseph Hernandez MD;  Location: [facility] OR     PHACOEMULSIFICATION WITH STANDARD INTRAOCULAR LENS IMPLANT Right 5/4/2015    Procedure: PHACOEMULSIFICATION WITH STANDARD INTRAOCULAR LENS IMPLANT;  Surgeon: Joseph Hernandez MD;  Location: [facility] OR     REPAIR HAMMER TOE Left 4/2/2019    Procedure: 5th Metatarsal Head Resection;  Surgeon: Ethan Montesinos DPM;  Location: [facility] OR        Family History   Problem Relation Age of Onset     Cancer Mother         intestinal CA     Diabetes Father      Diabetes Brother      Other Cancer Son         meagan tumor     Other Cancer Brother         pancreatic cancer     Diabetes Son       Melanoma No family hx of        Social History     Socioeconomic History     Marital status:      Spouse name: Not on file     Number of children: Not on file     Years of education: Not on file     Highest education level: Not on file   Occupational History     Not on file   Tobacco Use     Smoking status: Never Smoker     Smokeless tobacco: Never Used   Substance and Sexual Activity     Alcohol use: Yes     Alcohol/week: 1.7 standard drinks     Comment: 1 x month     Drug use: No     Sexual activity: Not Currently     Partners: Female   Other Topics Concern     Parent/sibling w/ CABG, MI or angioplasty before 65F 55M? No   Social History Narrative     Not on file     Social Determinants of Health     Financial Resource Strain: Not on file   Food Insecurity: Not on file   Transportation Needs: Not on file   Physical Activity: Not on file   Stress: Not on file   Social Connections: Not on file   Intimate Partner Violence: Not on file   Housing Stability: Not on file       Outpatient Encounter Medications as of 3/25/2022   Medication Sig Dispense Refill     amLODIPine (NORVASC) 5 MG tablet Take 1 tablet (5 mg) by mouth daily 90 tablet 3     aspirin (ASA) 325 MG tablet Take 1 tablet (325 mg) by mouth daily 30 tablet 0     blood glucose (ONETOUCH ULTRA) test strip 1 strip by In Vitro route daily One touch Ultra. Hold on file until needed. 100 strip 3     Blood Glucose Monitoring Suppl (ONE TOUCH ULTRA SYSTEM KIT) W/DEVICE KIT        clopidogrel (PLAVIX) 75 MG tablet Take 1 tablet (75 mg) by mouth daily 90 tablet 3     dorzolamide-timolol (COSOPT) 2-0.5 % ophthalmic solution        hydrochlorothiazide (HYDRODIURIL) 25 MG tablet Take 1 tablet (25 mg) by mouth daily 90 tablet 3     insulin glargine (LANTUS SOLOSTAR) 100 UNIT/ML pen Inject 20 Units Subcutaneous At Bedtime 30 mL 11     insulin pen needle (B-D U/F) 31G X 8 MM miscellaneous AS DIRECTED daily.  Hold on file until needed. 100 each 3     Lancets (ONETOUCH  DELICA PLUS [id number]) MISC 1 each by In Vitro route See Admin Instructions Hold on file until needed. 100 each 3     lisinopril (ZESTRIL) 40 MG tablet Take 1 tablet (40 mg) by mouth daily 90 tablet 3     metFORMIN (GLUCOPHAGE) 500 MG tablet Take 2 tablets (1,000 mg) by mouth 2 times daily (with meals) 360 tablet 3     Multiple Vitamins-Minerals (PRESERVISION AREDS PO) Take 1 tablet by mouth       ORDER FOR DME Equipment being ordered:   Glucometer 1 Device 1     pantoprazole (PROTONIX) 20 MG EC tablet Take 1 tablet (20 mg) by mouth daily 90 tablet 3     rosuvastatin (CRESTOR) 10 MG tablet Take 1 tablet (10 mg) by mouth At Bedtime 90 tablet 3     triamcinolone (KENALOG) 0.1 % external cream Apply topically 2 times daily 80 g 1     No facility-administered encounter medications on file as of 3/25/2022.             O:   NAD, WDWN, Alert & Oriented, Mood & Affect wnl, Vitals stable   Here today alone   /76 (BP Location: Left arm, Patient Position: Sitting, Cuff Size: Adult Large)   Pulse 70   SpO2 99%    General appearance normal   Vitals stable   Alert, oriented and in no acute distress     1.0 cm brown irregular thin plaque on left lateral neck   Gritty papules on left ear x 3, right forehead x 2, left cheek x 1, right temple x1  Stuck on papules and brown macules on trunk and ext   Red papules on trunk    The remainder of skin exam is normal       Eyes: Conjunctivae/lids:Normal     ENT: Lips normal    MSK:Normal    Cardiovascular: peripheral edema none    Pulm: Breathing Normal    Neuro/Psych: Orientation:Alert and Orientedx3 ; Mood/Affect:normal     A/P:  1. R/O seborrheic keratosis on left lateral neck   TANGENTIAL BIOPSY SENT OUT:  After consent, anesthesia with LEC and prep, tangential excision performed and specimen sent out for permanent section histology.  No complications and routine wound care. Patient told to call our office in 1-2 weeks for result.      2. Actinic keratoses on left ear x 3, right  forehead x 2, left cheek x 1, right temple x1  LN2:  Treated with LN2 for 5s for 1-2 cycles. Warned risks of blistering, pain, pigment change, scarring, and incomplete resolution.  Advised patient to return if lesions do not completely resolve.  Wound care sheet given.  3. Seborrheic keratosis, lentigo, angioma, benign nevi, history of SCC  BENIGN LESIONS DISCUSSED WITH PATIENT:  I discussed the specifics of tumor, prognosis, and genetics of benign lesions.  I explained that treatment of these lesions would be purely cosmetic and not medically neccessary.  I discussed with patient different removal options including excision, cautery and /or laser.      Nature and genetics of benign skin lesions dicussed with patient.  Signs and Symptoms of skin cancer discussed with patient.  ABCDEs of melanoma reviewed with patient.  Patient encouraged to perform monthly skin exams.  UV precautions reviewed with patient.  Risks of non-melanoma skin cancer discussed with patient   Return to clinic pending biopsy results.

## 2023-03-30 ENCOUNTER — HOSPITAL ENCOUNTER (OUTPATIENT)
Dept: HOSPITAL 47 - RADUSWWP | Age: 68
Discharge: HOME | End: 2023-03-30
Attending: FAMILY MEDICINE
Payer: MEDICARE

## 2023-03-30 DIAGNOSIS — N32.89: Primary | ICD-10-CM

## 2023-03-30 PROCEDURE — 76770 US EXAM ABDO BACK WALL COMP: CPT

## 2023-03-30 NOTE — US
EXAMINATION TYPE: US kidneys/renal and bladder

 

DATE OF EXAM: 3/30/2023

 

COMPARISON: NONE

 

CLINICAL HISTORY: N32.89 DISORDER OF BLADDER. Frequent urination and uti' s

 

EXAM MEASUREMENTS:

 

Right Kidney:  11.3 x 4.6 x 4.7  cm

Left Kidney: 10.4 x 4.3 x 4.2 cm

 

 

 

Right Kidney: wnl  

Left Kidney: wnl  

Bladder: just left of posterior mid medial to the left UVJ there are two 7mm echogenic structures wit
h strong color artifact; they do not appear to be mobile.  ?Fixed stones v wall calcification v other
?

**Bilateral Jets seen: yes

 

 

There is no evidence for hydronephrosis at this point in time.  No nephrolithiasis is seen.  No dotty
s are identified. Hyperechoic foci with posterior acoustic shadowing seen layering dependently in the
 bladder lumen. Bilateral ureteral jets are seen.

 

IMPRESSION:

1.  No evidence of obstructive uropathy.

2.  Suspected bladder calculi versus calcified wall, consider further evaluation CT pelvis as clinica
lly warranted.

## 2023-04-18 ENCOUNTER — HOSPITAL ENCOUNTER (OUTPATIENT)
Dept: HOSPITAL 47 - RADCTMAIN | Age: 68
Discharge: HOME | End: 2023-04-18
Attending: FAMILY MEDICINE
Payer: MEDICARE

## 2023-04-18 DIAGNOSIS — N32.89: ICD-10-CM

## 2023-04-18 DIAGNOSIS — N28.1: ICD-10-CM

## 2023-04-18 DIAGNOSIS — K44.9: Primary | ICD-10-CM

## 2023-04-18 DIAGNOSIS — I31.39: ICD-10-CM

## 2023-04-18 DIAGNOSIS — K57.10: ICD-10-CM

## 2023-04-18 DIAGNOSIS — N28.9: ICD-10-CM

## 2023-04-18 PROCEDURE — 74176 CT ABD & PELVIS W/O CONTRAST: CPT

## 2023-04-19 NOTE — CT
EXAMINATION TYPE: CT abdomen pelvis wo con

CT DLP: 791.2 mGycm, Automated exposure control for dose reduction was used.

 

DATE OF EXAM: 4/18/2023 6:22 PM

 

COMPARISON: CT abdomen pelvis most recent from  CT 2/21/2020 CT urogram 11/14/2011

 

CLINICAL INDICATION:Female, 67 years old with history of N28.9 DISORDER OF KIDNEY AND URETER, UNSPECI
FIED; flank pain

 

TECHNIQUE:  Axial CT of the abdomen and pelvis. Sagittal and coronal reformats were created on a [employer] workstation. 

 

Contrast used: None

Oral contrast used: without Oral Contrast 

 

FINDINGS: 

LOWER CHEST: Cardiac conduction leads partially visualized terminating in the right ventricle and rig
ht atrium. Trace pericardial effusion. Small hiatal hernia.

 

ABDOMEN

LIVER: Unremarkable

GALLBLADDER AND BILE DUCTS: Unremarkable.

PANCREAS: Unremarkable.

SPLEEN: Unremarkable.

ADRENAL GLANDS: Unremarkable.

KIDNEYS AND URETERS: No evidence of hydronephrosis or renal calculus. The ureters are unremarkable.  
Hyperdense left renal lesion measuring 8 mm. Unchanged from at least 2020 and likely represent protei
naceous/hemorrhagic cyst. This is mildly enlarged compared to 21. Measured 5 mm.

 

PELVIS

BLADDER: No evidence of bladder stone. Urinary bladder is incompletely distended.

 

REPRODUCTIVE: The uterus is surgically absent.

 

ABDOMEN & PELVIS

STOMACH AND BOWEL: No evidence of bowel obstruction. Second portion duodenal diverticulum. Small hiat
al hernia with postsurgical change the gastroesophageal junction.. Moderate stool burden throughout t
he colon. The cecum is located in the right upper quadrant

PERITONEUM/RETROPERITONEUM: No evidence of pneumoperitoneum or free fluid.

VASCULATURE: No evidence of aortic aneurysm. Moderate atherosclerosis throughout the arterial vascula
ture.

MUSCULOSKELETAL: No acute osseous abnormalities, Multilevel disc degeneration changes with grade 2 an
terolisthesis of L4 and L5 with increased lordosis at this level. There is at least mild-to-moderate 
spinal canal stenosis at this level. Scoliosis changes apex L3.

LYMPH NODES: No gross evidence for lymphadenopathy.

SOFT TISSUE/ABDOMINAL WALL: Left fat-containing inguinal hernia.

 

IMPRESSION:

1.  No evidence of obstructive uropathy, no renal calculi. No bladder stone. Incomplete distention of
 the urinary bladder without gross abnormality.

2.  Moderate stool burden throughout the colon.

3.  Second portion duodenal diverticulum.

4.  Small hiatal hernia with postsurgical change the gastroesophageal junction.

5.  Trace pericardial effusion.

6.  Stable left renal hyperdense cysts compared to 2020 and mild enlargement from 2011. Likely repres
enting proteinaceous/hemorrhagic cyst.

## 2023-04-24 ENCOUNTER — HOSPITAL ENCOUNTER (OUTPATIENT)
Dept: HOSPITAL 47 - RADXRMAIN | Age: 68
Discharge: HOME | End: 2023-04-24
Attending: FAMILY MEDICINE
Payer: MEDICARE

## 2023-04-24 ENCOUNTER — HOSPITAL ENCOUNTER (OUTPATIENT)
Dept: HOSPITAL 47 - LABPAT | Age: 68
Discharge: HOME | End: 2023-04-24
Attending: ORTHOPAEDIC SURGERY
Payer: MEDICARE

## 2023-04-24 DIAGNOSIS — I50.22: Primary | ICD-10-CM

## 2023-04-24 DIAGNOSIS — G90.A: ICD-10-CM

## 2023-04-24 DIAGNOSIS — I50.22: ICD-10-CM

## 2023-04-24 DIAGNOSIS — Z01.812: Primary | ICD-10-CM

## 2023-04-24 DIAGNOSIS — R73.9: ICD-10-CM

## 2023-04-24 DIAGNOSIS — I48.0: ICD-10-CM

## 2023-04-24 DIAGNOSIS — M16.12: ICD-10-CM

## 2023-04-24 LAB
APTT BLD: 25.7 SEC (ref 22–30)
INR PPP: 1 (ref ?–1.2)
PH UR: 5.5 [PH] (ref 5–8)
PT BLD: 10.3 SEC (ref 9–12)
SP GR UR: 1.02 (ref 1–1.03)
UROBILINOGEN UR QL: 0.2

## 2023-04-24 PROCEDURE — 71046 X-RAY EXAM CHEST 2 VIEWS: CPT

## 2023-04-24 PROCEDURE — 87086 URINE CULTURE/COLONY COUNT: CPT

## 2023-04-24 PROCEDURE — 85025 COMPLETE CBC W/AUTO DIFF WBC: CPT

## 2023-04-24 PROCEDURE — 80053 COMPREHEN METABOLIC PANEL: CPT

## 2023-04-24 PROCEDURE — 85610 PROTHROMBIN TIME: CPT

## 2023-04-24 PROCEDURE — 81003 URINALYSIS AUTO W/O SCOPE: CPT

## 2023-04-24 PROCEDURE — 87070 CULTURE OTHR SPECIMN AEROBIC: CPT

## 2023-04-24 PROCEDURE — 84443 ASSAY THYROID STIM HORMONE: CPT

## 2023-04-24 PROCEDURE — 85730 THROMBOPLASTIN TIME PARTIAL: CPT

## 2023-04-24 PROCEDURE — 83036 HEMOGLOBIN GLYCOSYLATED A1C: CPT

## 2023-04-24 PROCEDURE — 84439 ASSAY OF FREE THYROXINE: CPT

## 2023-04-24 PROCEDURE — 83880 ASSAY OF NATRIURETIC PEPTIDE: CPT

## 2023-04-24 PROCEDURE — 82043 UR ALBUMIN QUANTITATIVE: CPT

## 2023-04-24 PROCEDURE — 82570 ASSAY OF URINE CREATININE: CPT

## 2023-04-24 NOTE — XR
EXAMINATION TYPE: XR chest 2V

 

DATE OF EXAM: 4/24/2023 1:59 PM

 

COMPARISON: Chest radiographs from 7/20/2021

 

TECHNIQUE: XR chest 2V Frontal and lateral views of the chest.

 

CLINICAL INDICATION:Female, 67 years old with history of I50.22 CHRONIC SYSTOLIC (CONGESTIVE) HEART F
AILURE; 

 

FINDINGS: 

Lungs/Pleura: There is no evidence of pleural effusion, focal consolidation, or pneumothorax.  

Pulmonary vascularity: Unremarkable.

Heart/mediastinum: Cardiomediastinal silhouette is unremarkable. Single-lead cardiac conduction devic
e overlying the left hemithorax with lead projecting over the right ventricle.

Musculoskeletal: Degenerative changes of the shoulder joints.

 

IMPRESSION: 

No acute cardiopulmonary disease/process. No evidence for acute heart failure.

## 2023-04-25 LAB
ALBUMIN SERPL-MCNC: 4.5 G/DL (ref 3.8–4.9)
ALBUMIN/GLOB SERPL: 2.32 G/DL (ref 1.6–3.17)
ALP SERPL-CCNC: 69 U/L (ref 41–126)
ALT SERPL-CCNC: 17 U/L (ref 8–44)
ANION GAP SERPL CALC-SCNC: 10.1 MMOL/L (ref 10–18)
AST SERPL-CCNC: 19 U/L (ref 13–35)
BASOPHILS # BLD AUTO: 0.06 X 10*3/UL (ref 0–0.1)
BASOPHILS NFR BLD AUTO: 0.9 %
BUN SERPL-SCNC: 19.4 MG/DL (ref 9–27)
BUN/CREAT SERPL: 24.01 RATIO (ref 12–20)
CALCIUM SPEC-MCNC: 9.4 MG/DL (ref 8.7–10.3)
CHLORIDE SERPL-SCNC: 105 MMOL/L (ref 96–109)
CO2 SERPL-SCNC: 26.2 MMOL/L (ref 20–27.5)
EOSINOPHIL # BLD AUTO: 0.16 X 10*3/UL (ref 0.04–0.35)
EOSINOPHIL NFR BLD AUTO: 2.3 %
ERYTHROCYTE [DISTWIDTH] IN BLOOD BY AUTOMATED COUNT: 4.69 X 10*6/UL (ref 4.1–5.2)
ERYTHROCYTE [DISTWIDTH] IN BLOOD: 13.2 % (ref 11.5–14.5)
GLOBULIN SER CALC-MCNC: 1.9 G/DL (ref 1.6–3.3)
GLUCOSE SERPL-MCNC: 131 MG/DL (ref 70–110)
HCT VFR BLD AUTO: 44.6 % (ref 37.2–46.3)
HGB BLD-MCNC: 14.1 G/DL (ref 12–15)
IMM GRANULOCYTES BLD QL AUTO: 0.1 %
LYMPHOCYTES # SPEC AUTO: 1.52 X 10*3/UL (ref 0.9–5)
LYMPHOCYTES NFR SPEC AUTO: 22.1 %
MCH RBC QN AUTO: 30.1 PG (ref 27–32)
MCHC RBC AUTO-ENTMCNC: 31.6 G/DL (ref 32–37)
MCV RBC AUTO: 95.1 FL (ref 80–97)
MONOCYTES # BLD AUTO: 0.64 X 10*3/UL (ref 0.2–1)
MONOCYTES NFR BLD AUTO: 9.3 %
NEUTROPHILS # BLD AUTO: 4.5 X 10*3/UL (ref 1.8–7.7)
NEUTROPHILS NFR BLD AUTO: 65.3 %
NRBC BLD AUTO-RTO: 0 /100 WBCS (ref 0–0)
PLATELET # BLD AUTO: 293 X 10*3/UL (ref 140–440)
POTASSIUM SERPL-SCNC: 4.6 MMOL/L (ref 3.5–5.5)
PROT SERPL-MCNC: 6.4 G/DL (ref 6.2–8.2)
SODIUM SERPL-SCNC: 141 MMOL/L (ref 135–145)
T4 FREE SERPL-MCNC: 1.39 NG/DL (ref 0.8–1.8)
WBC # BLD AUTO: 6.89 X 10*3/UL (ref 4.5–10)

## 2023-05-02 ENCOUNTER — HOSPITAL ENCOUNTER (OUTPATIENT)
Dept: HOSPITAL 47 - OR | Age: 68
LOS: 1 days | Discharge: HOME HEALTH | End: 2023-05-03
Attending: ORTHOPAEDIC SURGERY
Payer: MEDICARE

## 2023-05-02 VITALS — BODY MASS INDEX: 36.1 KG/M2

## 2023-05-02 DIAGNOSIS — M16.12: Primary | ICD-10-CM

## 2023-05-02 DIAGNOSIS — Z83.3: ICD-10-CM

## 2023-05-02 DIAGNOSIS — Z79.899: ICD-10-CM

## 2023-05-02 DIAGNOSIS — Z82.49: ICD-10-CM

## 2023-05-02 DIAGNOSIS — Z88.5: ICD-10-CM

## 2023-05-02 DIAGNOSIS — M25.752: ICD-10-CM

## 2023-05-02 DIAGNOSIS — Z98.890: ICD-10-CM

## 2023-05-02 DIAGNOSIS — Z86.59: ICD-10-CM

## 2023-05-02 DIAGNOSIS — E78.5: ICD-10-CM

## 2023-05-02 DIAGNOSIS — E03.9: ICD-10-CM

## 2023-05-02 DIAGNOSIS — I11.0: ICD-10-CM

## 2023-05-02 DIAGNOSIS — I48.91: ICD-10-CM

## 2023-05-02 DIAGNOSIS — J45.909: ICD-10-CM

## 2023-05-02 DIAGNOSIS — I50.9: ICD-10-CM

## 2023-05-02 PROCEDURE — 88311 DECALCIFY TISSUE: CPT

## 2023-05-02 PROCEDURE — 86850 RBC ANTIBODY SCREEN: CPT

## 2023-05-02 PROCEDURE — 88305 TISSUE EXAM BY PATHOLOGIST: CPT

## 2023-05-02 PROCEDURE — 86900 BLOOD TYPING SEROLOGIC ABO: CPT

## 2023-05-02 PROCEDURE — 27130 TOTAL HIP ARTHROPLASTY: CPT

## 2023-05-02 PROCEDURE — 73501 X-RAY EXAM HIP UNI 1 VIEW: CPT

## 2023-05-02 PROCEDURE — 64447 NJX AA&/STRD FEMORAL NRV IMG: CPT

## 2023-05-02 PROCEDURE — 97535 SELF CARE MNGMENT TRAINING: CPT

## 2023-05-02 PROCEDURE — 85025 COMPLETE CBC W/AUTO DIFF WBC: CPT

## 2023-05-02 PROCEDURE — 86901 BLOOD TYPING SEROLOGIC RH(D): CPT

## 2023-05-02 PROCEDURE — 97161 PT EVAL LOW COMPLEX 20 MIN: CPT

## 2023-05-02 PROCEDURE — 97165 OT EVAL LOW COMPLEX 30 MIN: CPT

## 2023-05-02 RX ADMIN — POTASSIUM CHLORIDE SCH MLS: 14.9 INJECTION, SOLUTION INTRAVENOUS at 08:40

## 2023-05-02 RX ADMIN — SACUBITRIL AND VALSARTAN SCH EACH: 49; 51 TABLET, FILM COATED ORAL at 21:10

## 2023-05-02 RX ADMIN — HYDROCODONE BITARTRATE AND ACETAMINOPHEN PRN EACH: 7.5; 325 TABLET ORAL at 16:45

## 2023-05-02 RX ADMIN — POTASSIUM CHLORIDE SCH: 14.9 INJECTION, SOLUTION INTRAVENOUS at 17:01

## 2023-05-02 RX ADMIN — POTASSIUM CHLORIDE SCH: 14.9 INJECTION, SOLUTION INTRAVENOUS at 15:23

## 2023-05-02 RX ADMIN — HYDROCODONE BITARTRATE AND ACETAMINOPHEN PRN EACH: 7.5; 325 TABLET ORAL at 22:26

## 2023-05-02 NOTE — P.ANPRN
Procedure Note - Anesthesia





- Nerve Block Performed


  ** Left Juan Single


Time Out Performed: Yes


Date of Procedure: 05/02/23


Procedure Start Time: 08:47


Procedure Stop Time: 08:51


Location of Patient: PreOp


Indication: Acute Post-Operative Pain, Requested by Surgeon


Sedation Type: Sedate with meaningful contact maintained


Preparation: Sterile Prep


Position: Supine


Needle Types: Pajunk


Ultrasound used to visualize needle placement: Yes


Ultrasound used to observe medication spread: Yes


Blood Aspirated: No


Pain Paresthesia on Injection Noted: No


Resistance on Injection: Normal


Image Stored and Saved: Yes


Events: Uneventful and Well Tolerated (Ropivacaine 0.5% 20 mL plus dexamethasone

4 mg)

## 2023-05-02 NOTE — XR
EXAMINATION TYPE: XR Hip Limited LT, FL guidance operating room

 

DATE OF EXAM: 5/2/2023

 

Comparison: None

 

Clinical History: 67-year-old female OA left hip

 

Findings:

Intraoperative fluoroscopic images during left total hip arthroplasty.

 

FLUOROSCOPY

 

Fluoroscopy time of 43 seconds was used during left total hip arthroplasty.  3 image/s document/s the
 procedure.  DAP 1.1120.

 

 

IMPRESSION:

Intraoperative fluoroscopy as above.

## 2023-05-02 NOTE — XR
EXAMINATION TYPE: XR Hip Limited LT

 

DATE OF EXAM: 5/2/2023

 

Comparison: None

 

Clinical History: 67-year-old female Status post hip surgery, assess surgical alignment

 

Findings:

Image shows placement of a left total hip arthroplasty. Both acetabular cup and femoral stem componen
ts of the prosthesis are well seated without periprosthetic fracture. Alignment grossly anatomic. Sof
t tissue air related to recent operation.

 

 

Impression:

Uncomplicated postoperative appearance left total hip arthroplasty.

## 2023-05-02 NOTE — P.OP
Date of Procedure: 05/02/23


Preoperative Diagnosis: 


Severe osteoarthritis left hip


Postoperative Diagnosis: 


Severe osteoarthritis left hip


Procedure(s) Performed: 


Left total hip arthroplasty with direct anterior approach


Implants: 


Smith & Nephew Polarstem standard size 5 with a collar


Smith [employer] Nephew R3, 3 hole hemispherical acetabular shell, 48 mm


Smith & Nephew Reflection 6.5 mm cancellus screw, 20 mm, 25 mm


[employer] Nephew R3, XLPE 20 acetabular liner 


Smith & Nephew Oxinium femoral head 32 m, +4


All components were press-fit.


The articulation is Oxinium on polyethylene.


Anesthesia: spinal


Surgeon: Neil Batres


Assistant #1: Dorota Rubio


Estimated Blood Loss (ml): 150


Pathology: other (Bone and cartilage)


Condition: stable


Disposition: PACU


Indications for Procedure: 


After failure of conservative treatment we discussed the surgical and 

nonsurgical treatment options at length.  Patient wishes to proceed with a total

hip arthroplasty with a direct anterior approach.  Complications specific to 

this procedure were discussed at length, including but not limited to infection,

leg length discrepancy, dislocation, nerve injury, and fracture.  Covid-19 was 

also discussed at length with the patient, and they are aware of the current 

policies and procedures.  The patient was given the option of delaying surgery, 

but they elect to proceed knowing these risks.  Patient is aware of all these 

complications and informed consent was obtained


Operative Findings: 


The operative findings are consistent with severe osteoarthritis of the left hip


Description of Procedure: 


The patient was seen and evaluated in the preoperative area and the consent was 

reviewed.  The operative site was marked with a skin marker.  The patient 

verified the procedure and operative site.  A PENG block was placed by 

anesthesia in the preoperative area. The patient was then brought to the 

operating room and given preoperative antibiotics intravenously.  1 g of 

Tranexamic acid was also given intravenously.  A spinal anesthetic was 

administered by the anesthesia department.   The patient was then placed on the 

[place] table with the bony prominences well-padded.  The hip area was then prepped

with a ChloraPrep solution and draped in the usual sterile fashion.





A universal timeout was then performed, which confirmed the patient's name, 

surgical site, ALLERGIES, and procedure being performed on the consent.  Next 

the incision site was located at 1 cm distal and 4 cm lateral to the anterior 

superior iliac spine.  The skin and subcutaneous tissues were sharply incised.  

Incision was carefully dissected down to the fascia overlying the tensor fascia 

aho muscle.  This fascia was then incised in line with the muscle fibers.  Care

was taken to stay laterally in order to avoid injuring the lateral femoral 

cutaneous nerve.  Next, using blunt finger dissection, the tensor fascia hao 

muscle was dissected off its investing fascia.  The muscle was then carefully 

retracted laterally with a cobra retractor over the lateral neck of the femur.  

Next, the circumflex vessels were identified and cauterized using the Aquamantis

device.  The anterior hip capsule was then exposed.  The capsule was then opened

and an inverted T fashion.  The retractors were then placed intracapsularly.  

The retractors were maintained intracapsular throughout the procedure.  The 

proximal femur was then visualized.  Fluoroscopic x-rays were then taken in 

order to evaluate the preoperative leg lengths.  A small amount of traction was 

placed on the leg.





The femoral neck was then osteotomized at the appropriate level above the lesser

trochanter.  A small wedge of bone was then removed from the remaining femoral 

head.  Next, using a corkscrew the femoral head was removed from the acetabulum.

 On gross visual inspection, the femoral head had complete loss of articular 

cartilage and multiple periarticular osteophytes.  The femoral head was then 

measured.  Attention was then turned to the acetabulum.





The acetabulum was exposed and any remaining labrum was excised.  Sequential 

reaming of the acetabulum was performed using fluoroscopic guidance until there 

was a good bed of bleeding cancellus bone.  When the appropriate size was 

reached, a trial was then placed.  The position and fit of the trial was checked

with fluoroscopy.  The trial was then removed.  Then, using fluoroscopic 

guidance, the final implant was impacted at 20 of anteversion and 40 of 

abduction, and fully seated in the acetabulum.  2 screws were then placed in the

acetabulum.  Again fluoroscopy was used to check position of the screws.  Next, 

the liner was then impacted, with a 20 elevated liner located in the anterior 

superior quadrant.  Component locking was confirmed.





Attention was then directed to the femur.  With the aid of the [place] table, the 

femur was externally rotated to approximately 130, extended, and adducted under

the opposite leg.  A side hook was then placed under the proximal femur, and the

side hook elevator was used to elevate the proximal femur while releasing the 

capsule.  Retractors were then placed.  A capsular release was performed, as 

well as a release of the conjoined tendon, which afforded excellent 

visualization of the proximal femur.  Next, a box osteotome was used to 

lateralize the proximal femur.  A  was then used to locate the 

femoral canal.  Sequential broaching was then performed with appropriate size 

which afforded excellent fixation in the proximal femur.  A trial was then 

placed with appropriate head and neck, and the hip was gently reduced with the 

aid of the [place] table.  Fluoroscopy was then used to check position of the 

components, as well as to evaluate the leg lengths and offset.  The leg lengths 

and offset were measured as closely as possible to ensure stability of the hip. 

The hip was then gently dislocated and the trials were then removed.  Final 

implants were then impacted and the hip was again reduced.  Final fluoroscopic 

x-rays confirmed that the components were in anatomic position.  The leg lengths

and offset were measured and were found to coincide with the trial measurements.

 The hip was also taken through range of motion, and found to be stable.





The hip was then copiously irrigated with antibiotic solution with pulsatile 

lavage.  The hip was then irrigated with Irrisept solution.  The soft tissues 

were then injected with a ropivacaine solution.  A second dose of 1 g of 

Tranexamic acid was also given intravenously. 





The fascia was then closed with 2-0 strata fix suture.  The subcutaneous tissue 

was closed with 3-0 Vicryl.  The subcuticular tissue was closed with 3-0 strata 

fix suture.  The skin was then closed with Exofin skin glue.  After the glue and

dried, and Optifoam silver impregnated dressing was applied.  The patient was 

then transferred to the recovery room in stable condition.





The assistant  NARDA Chung was required due to the complexity of surgery, 

and the need for skilled surgical assistant for positioning, draping, exposure, 

retraction, and closure of the wound.

## 2023-05-03 VITALS
TEMPERATURE: 97 F | HEART RATE: 80 BPM | DIASTOLIC BLOOD PRESSURE: 65 MMHG | SYSTOLIC BLOOD PRESSURE: 104 MMHG | RESPIRATION RATE: 19 BRPM

## 2023-05-03 LAB
BASOPHILS # BLD AUTO: 0.01 X 10*3/UL (ref 0–0.1)
BASOPHILS NFR BLD AUTO: 0.1 %
EOSINOPHIL # BLD AUTO: 0.02 X 10*3/UL (ref 0.04–0.35)
EOSINOPHIL NFR BLD AUTO: 0.2 %
ERYTHROCYTE [DISTWIDTH] IN BLOOD BY AUTOMATED COUNT: 3.76 X 10*6/UL (ref 4.1–5.2)
ERYTHROCYTE [DISTWIDTH] IN BLOOD: 13.2 % (ref 11.5–14.5)
HCT VFR BLD AUTO: 34.8 % (ref 37.2–46.3)
HGB BLD-MCNC: 11.2 G/DL (ref 12–15)
IMM GRANULOCYTES BLD QL AUTO: 0.4 %
LYMPHOCYTES # SPEC AUTO: 0.97 X 10*3/UL (ref 0.9–5)
LYMPHOCYTES NFR SPEC AUTO: 10.1 %
MCH RBC QN AUTO: 29.8 PG (ref 27–32)
MCHC RBC AUTO-ENTMCNC: 32.2 G/DL (ref 32–37)
MCV RBC AUTO: 92.6 FL (ref 80–97)
MONOCYTES # BLD AUTO: 1.38 X 10*3/UL (ref 0.2–1)
MONOCYTES NFR BLD AUTO: 14.4 %
NEUTROPHILS # BLD AUTO: 7.15 X 10*3/UL (ref 1.8–7.7)
NEUTROPHILS NFR BLD AUTO: 74.8 %
NRBC BLD AUTO-RTO: 0 /100 WBCS (ref 0–0)
PLATELET # BLD AUTO: 208 X 10*3/UL (ref 140–440)
WBC # BLD AUTO: 9.57 X 10*3/UL (ref 4.5–10)

## 2023-05-03 RX ADMIN — HYDROCODONE BITARTRATE AND ACETAMINOPHEN PRN EACH: 7.5; 325 TABLET ORAL at 06:42

## 2023-05-03 RX ADMIN — SACUBITRIL AND VALSARTAN SCH EACH: 49; 51 TABLET, FILM COATED ORAL at 09:26

## 2023-05-03 RX ADMIN — HYDROCODONE BITARTRATE AND ACETAMINOPHEN PRN EACH: 7.5; 325 TABLET ORAL at 12:12

## 2023-05-03 NOTE — P.CONS
History of Present Illness





- Reason for Consult


Consult date: 05/03/23


Medical management postop left hip arthroplasty





- History of Present Illness








This is a  very pleasant 67-year-old female who was recently admitted under 

orthopedic services underwent left hip arthroplasty.  Patient reports she 

follows with Dr. Fraser in the outpatient setting with a past medical history of 

atrial fibrillation, melanoma, heart failure, fibromyalgia, GERD, 

hyperlipidemia, hypertension, osteoarthritis and hypothyroidism.  Patient also 

reports she does have some anxiety and depression and is a former smoker 

occasionally drinks some alcohol with social events and does use edible 

marijuana on occasion.  Patient denies any illicit drug use.  Patient is postop 

left total hip arthroplasty and doing relatively well.  Patient did have a bout 

of nausea this morning most likely secondary to medications and narcotics that 

resolved with Zofran.  Will prescribe as needed oral Zofran for home.  Patient 

with an incentive spirometer at the bedside encouraged to continue using at 

least 10 times every hour while awake.  Patient reports she has support at home 

with her  and has a walker.  Plans for discharge today.  Patient is 

medically stable and denies chest pain, shortness of breath, or palpitations.  

WBC reviewed this morning and within normal limits at 9.57, hemoglobin is stable

at 11.2.





Review Of Systems:


Constitutional: No fever, no chills, no night sweats.  No weight change.  No 

weakness, fatigue or lethargy.  No daytime sleepiness.


EENT: No headache.  No blurred vision or double vision, no loss of vision.  No 

loss of Hearing, no ringing in the ears, no dizziness.  No nasal drainage or 

congestion.  No epistaxis.  No sore throat.


Lungs: No shortness of breath, cough, no sputum production.  No wheezing.


Cardiovascular: No chest pain, no lower extremity edema.  No palpitations.  No 

paroxysmal nocturnal dyspnea.  No orthopnea.  No lightheadedness or dizziness.  

No syncopal episodes.


Abdominal: No abdominal pain.  No nausea, vomiting.  No diarrhea.  No 

constipation.  No bloody or tarry stools..  No loss of appetite.


Genitourinary: No dysuria, increased frequency, urgency.  No urinary retention.


Musculoskeletal: No myalgias.  No muscle weakness, no gait dysfunction, no 

frequent falls.  No back pain.  No neck pain.  Reports of mild left hip 

discomfort


Integumentary: No wounds, no lesions.  No rash or pruritus.  No unusual 

bruising.  No change in hair or nails.


Neurologic: No aphasia. No facial droop. No change in mentation. No head injury.

No headache. No paralysis. No paresthesia.


Psychiatric: No depression.  No anxiety.  No mood swings.


Endocrine: No abnormal blood sugars.  No weight change.  No excessive sweating 

or thirst.  No cold intolerance.  











PHYSICAL EXAMINATION: 





GENERAL: The patient is alert and oriented x4,  Well developed, well nourished. 


HEENT: Pupils are round and equally reacting to light. EOMI. no scleral icterus.

No conjunctival pallor. Normocephalic, atraumatic. No pharyngeal erythema. No 

thyromegaly.  


CARDIOVASCULAR: S1 and S2  muffled 


PULMONARY: Breath sounds clear to auscultation with no wheezing or rhonchi 

noted. 


ABDOMEN: soft.  Nontender on exam.   non-distended, normoactive bowel sounds. No

palpable organomegaly. 


MUSCULOSKELETAL: No joint swelling or deformity.


EXTREMITIES: No cyanosis, clubbing, or pedal edema.  Left hip surgical dressing 

is dry and intact with some bruising noted although soft and palpable and there 

is some noted left lower extremity swelling more so than the right


NEUROLOGICAL: Gross neurological examination did not reveal any focal deficits. 


SKIN: No rashes. 





Assessment:





Status post left total hip arthroplasty


History of atrial fibrillation, currently rate controlled


Hypertension


Hypothyroidism


GERD


History of heart failure, not in exacerbation with preserved EF


Hyperlipidemia


Obesity with a BMI of 35.9


Anxiety/depression


GI prophylaxis


DVT prophylaxis


Full code





Plan:





Recommend to continue with current medications and management per orthopedic 

services.  Patient was seen and evaluated by physical therapy and did relatively

well.  Patient plans on returning home with her  who has support and does

have a walker at home


Home medications reviewed and resumed and encouraged to follow-up with primary 

care provider Dr. Fraser outpatient.  Patient did go to cardiology as well as Dr. Fraser for pre-surgical clearance


Patient with incentive spirometer at the bedside encouraged to continue using at

least 10 times every hour while awake


Patient did have a small episode of nausea with pain meds and will add Zofran 

and provided a prescription for home as needed


Encouraged fluids and stool softeners with pain management deferred to 

orthopedics


Patient is on eliquis for atrial fibrillation and will continue


Patient is medically stable for discharge today


Thank you kindly for this consultation.  We will continue to follow during 

hospitalization.








The impression and plan of care has been dictated by Olga Ambrocio, nurse 

practitioner as directed.





Dr. Aniceto MD


I have performed a history and examination and MDM of this patient, discussed 

the same with the dictator, and  agree with the dictator's assessment and plan 

as written ,documented as a scribe. Based on total visit time,  I have performed

more than 50% of the visit. Any additional findings or plans will be noted. 





Past Medical History


Past Medical History: Atrial Fibrillation, Cancer, Heart Failure, Fibromyalgia, 

GERD/Reflux, Hyperlipidemia, Hypertension, Osteoarthritis (OA), Thyroid Disorder


Additional Past Medical History / Comment(s): POTS, MTHFR mutation, hx melanoma,

AICD, Hiatal hernia (recurred after surgery), diverticulitis, hypothyroid.


History of Any Multi-Drug Resistant Organisms: None Reported


Past Surgical History: AICD, Back Surgery, Heart Catheterization, Hysterectomy, 

Joint Replacement, Orthopedic Surgery


Additional Past Surgical History / Comment(s): anisha total knees., bilateral 

shoulders surgery, joint rt thumb replaced and revised.  Heart cath with no 

stents. Right knee revision in 2016. Back surgery .,  Previous pain injections. 

, hiatal hernia (2022), cataracts


Past Anesthesia/Blood Transfusion Reactions: Previous Problems w/ Anesthesia


Additional Past Anesthesia/Blood Transfusion Reaction / Comm: POTS episode 

during anesthesia (severe drop in BP after knee replacement surgery)


Type of Cardiac Device: Permanent Pacemaker, AICD


Device Placement Date:: 6/2016/2018


Past Psychological History: Anxiety, Depression


Smoking Status: Former smoker


Past Alcohol Use History: Occasional


Additional Past Alcohol Use History / Comment(s): quit smoking 2016. Smoke off 

and on for 30 years < 1/2 PPD


Past Drug Use History: Marijuana


Additional Drug Use History / Comment(s): edible marijuana, last had last week





- Past Family History


  ** Sister(s)


Family Medical History: Cancer, CVA/TIA





  ** Brother(s)


Family Medical History: Cancer, Hypertension





Medications and Allergies


                                Home Medications











 Medication  Instructions  Recorded  Confirmed  Type


 


RX: Nabumetone [Relafen] 1,500 mg PO DAILY 05/02/16 05/02/23 History


 


RX: Liothyronine Sodium [Cytomel] 10 mcg PO DAILY 08/31/16 05/02/23 History


 


RX: Apixaban [Eliquis] 5 mg PO BID 07/28/21 05/02/23 History


 


RX: Levothyroxine Sodium 75 mcg PO DAILY 07/28/21 05/02/23 History





[Synthroid]    


 


RX: Metoprolol Succinate [Toprol 100 mg PO DAILY 07/28/21 05/02/23 History





XL]    


 


Lasix (Unknown Dose) 1 dose PO DAILY PRN 04/27/23 05/02/23 History


 


RX: Cyclobenzaprine [Flexeril] 5 mg PO DAILY PRN 04/27/23 05/02/23 History


 


RX: Dapagliflozin Propanediol 10 mg PO HS 04/27/23 05/02/23 History





[Farxiga]    


 


RX: Estradiol Cream [Estrace Cream 1 gm VAGINAL AS DIRECTED 04/27/23 05/02/23 

History





0.01%]    


 


RX: Levothyroxine Sodium 0.5 tab PO MOTUTHSA 04/27/23 05/02/23 History





[Synthroid]    


 


RX: Multivit-Min/Iron/Folic/Lutein 1 each PO DAILY 04/27/23 05/02/23 History





[Centrum Silver Women Tablet]    


 


RX: Omeprazole Magnesium [PriLOSEC 20 mg PO BID PRN 04/27/23 05/02/23 History





OTC]    


 


RX: Sacubitril/Valsartan [Entresto 1 each PO BID 04/27/23 05/02/23 History





49 mg-51 mg Tablet]    


 


RX: Sertraline [Zoloft] 200 mg PO DAILY 04/27/23 05/02/23 History


 


RX: traZODone HCL [Desyrel] 50 - 100 mg PO HS 04/27/23 05/02/23 History


 


HYDROcodone/APAP 7.5-325MG [Norco 1 - 2 tab PO Q6H PRN #32 tab 05/02/23  Rx





7.5-325]    


 


RX: Rizatriptan Benzoate [Maxalt] 10 mg PO TID PRN 05/02/23 05/02/23 History


 


Sennosides [Senokot] 2 tab PO DAILY PRN #60 tablet 05/02/23  Rx


 


Ondansetron Odt [Zofran Odt] 4 mg PO Q8HR PRN #20 tab 05/03/23  Rx


 


RX: Cyclobenzaprine [Flexeril] 10 mg PO HS PRN #30 tab 05/03/23  Rx








                                    Allergies











Allergy/AdvReac Type Severity Reaction Status Date / Time


 


gabapentin Allergy Unknown Rash/Hives, Verified 05/02/23 08:20





   itching  


 


codeine Allergy  Nausea & Verified 05/02/23 08:20





   Vomiting  














Physical Exam


Vitals: 


                                   Vital Signs











  Temp Pulse Pulse Resp BP Pulse Ox


 


 05/03/23 07:40  97.0 F L  80   19  104/65  99


 


 05/03/23 02:00  97.6 F  78   16  96/60  95


 


 05/02/23 19:42  98.4 F  83   18  113/73  94 L


 


 05/02/23 14:46  97.9 F  83   18  133/66  98


 


 05/02/23 14:15   79   15  124/61  97


 


 05/02/23 13:55   84   15  127/62  97


 


 05/02/23 13:25   82   15  120/58  97


 


 05/02/23 12:55   84   15  122/58  99


 


 05/02/23 12:25   76   16  126/58  98


 


 05/02/23 11:55   72   16  122/57  99


 


 05/02/23 11:25    66  15  126/57  96


 


 05/02/23 11:10    72  15  121/53  100


 


 05/02/23 10:55    71  14  121/58  100


 


 05/02/23 10:40    72  15  120/56  100


 


 05/02/23 10:25  97.4 F L   82  16  116/97 








                                Intake and Output











 05/02/23 05/03/23 05/03/23





 22:59 06:59 14:59


 


Intake Total  50 


 


Balance  50 


 


Intake:   


 


  Intake, IV Titration  50 





  Amount   


 


    ceFAZolin 2 gm In Sodium  50 





    Chloride 0.9% 50 ml @ 100   





    mls/hr IVPB Q8HR [facility] Rx#   





    :[id number]   


 


Other:   


 


  Voiding Method Toilet  


 


  # Voids 1 2 


 


  Weight 91.9 kg  














Results


CBC & Chem 7: 


                                 05/03/23 06:04





Labs: 


                  Abnormal Lab Results - Last 24 Hours (Table)











  05/03/23 Range/Units





  06:04 


 


RBC  3.76 L  (4.10-5.20)  X 10*6/uL


 


Hgb  11.2 L  (12.0-15.0)  g/dL


 


Hct  34.8 L  (37.2-46.3)  %


 


Monocytes #  1.38 H  (0.20-1.00)  X 10*3/uL


 


Eosinophils #  0.02 L  (0.04-0.35)  X 10*3/uL

## 2023-05-03 NOTE — P.DS
Providers


Expected date of discharge: 05/03/23


Attending physician: 


Neil Batres





Consults: 





                                        





05/02/23 10:38


Consult Physician Routine 


   Consulting Provider: aLverne Patel


   Consult Reason/Comments: medical management


   Do you want consulting provider notified?: Yes











Primary care physician: 


Keira Agarwalo








- Discharge Diagnosis(es)


(1) Primary localized osteoarthritis of left hip


Current Visit: Yes   Status: Acute   





(2) Status post total replacement of left hip


Current Visit: Yes   Status: Acute   


Hospital Course: 


This is a 67-year-old Female with history of degenerative arthritis of the Left 

hip.  The patient has failed outpatient conservative treatment and presents to 

discuss surgical options.  After discussion and consideration the patient elects

to proceed with total Left hip arthroplasty with direct anterior approach.  The 

patient is evaluated preoperatively by the primary care physician and cleared 

for surgery.


The patient is admitted to [facility] on 5/2/2023 for total 

Left hip arthroplasty was direct anterior approach.  The procedure is performed 

without complication or sequelae.  The patient is doing well postoperatively.  

The patient may be discharged to home today in good condition.  Please see med 

rec for accurate list of home medications.








Plan - Discharge Summary


Discharge Rx Participant: No


New Discharge Prescriptions: 


New


   HYDROcodone/APAP 7.5-325MG [Norco 7.5-325] 1 - 2 tab PO Q6H PRN #32 tab


     PRN Reason: Pain


   Sennosides [Senokot] 2 tab PO DAILY PRN #60 tablet


     PRN Reason: Constipation


   Cyclobenzaprine [Flexeril] 10 mg PO HS PRN #30 tab


     PRN Reason: Spasms





No Action


   Cyclobenzaprine [Flexeril] 10 mg PO HS


   Nabumetone [Relafen] 1,500 mg PO DAILY


   Liothyronine Sodium [Cytomel] 10 mcg PO DAILY


   HYDROcodone/APAP 10-325MG [Norco ] 1 tab PO TID PRN


     PRN Reason: Pain


   Metoprolol Succinate [Toprol XL] 100 mg PO DAILY


   Levothyroxine Sodium [Synthroid] 75 mcg PO DAILY


   Levothyroxine Sodium [Synthroid] 0.5 tab PO MOTUTHSA


   traZODone HCL [Desyrel] 50 - 100 mg PO HS


   Estradiol Cream [Estrace Cream 0.01%] 1 gm VAGINAL AS DIRECTED


   Sertraline [Zoloft] 200 mg PO DAILY


   Sacubitril/Valsartan [Entresto 49 mg-51 mg Tablet] 1 each PO BID


   Multivit-Min/Iron/Folic/Lutein [Centrum Silver Women Tablet] 1 each PO DAILY


   Rizatriptan Benzoate [Maxalt] 10 mg PO TID PRN


     PRN Reason: Migraine Headache


   Apixaban [Eliquis] 5 mg PO BID


   Cyclobenzaprine [Flexeril] 5 mg PO DAILY PRN


     PRN Reason: Pain


   Omeprazole Magnesium [PriLOSEC OTC] 20 mg PO BID PRN


     PRN Reason: Heartburn


   Lasix (Unknown Dose) 1 dose PO DAILY PRN


     PRN Reason: Edema


   Dapagliflozin Propanediol [Farxiga] 10 mg PO HS


Discharge Medication List





Cyclobenzaprine [Flexeril] 10 mg PO HS 05/02/16 [History]


Nabumetone [Relafen] 1,500 mg PO DAILY 05/02/16 [History]


Liothyronine Sodium [Cytomel] 10 mcg PO DAILY 08/31/16 [History]


HYDROcodone/APAP 10-325MG [Norco ] 1 tab PO TID PRN 01/03/17 [History]


Apixaban [Eliquis] 5 mg PO BID 07/28/21 [History]


Levothyroxine Sodium [Synthroid] 75 mcg PO DAILY 07/28/21 [History]


Metoprolol Succinate [Toprol XL] 100 mg PO DAILY 07/28/21 [History]


Cyclobenzaprine [Flexeril] 5 mg PO DAILY PRN 04/27/23 [History]


Dapagliflozin Propanediol [Farxiga] 10 mg PO HS 04/27/23 [History]


Estradiol Cream [Estrace Cream 0.01%] 1 gm VAGINAL AS DIRECTED 04/27/23 

[History]


Lasix (Unknown Dose) 1 dose PO DAILY PRN 04/27/23 [History]


Levothyroxine Sodium [Synthroid] 0.5 tab PO MOTUTHSA 04/27/23 [History]


Multivit-Min/Iron/Folic/Lutein [Centrum Silver Women Tablet] 1 each PO DAILY 

04/27/23 [History]


Omeprazole Magnesium [PriLOSEC OTC] 20 mg PO BID PRN 04/27/23 [History]


Sacubitril/Valsartan [Entresto 49 mg-51 mg Tablet] 1 each PO BID 04/27/23 

[History]


Sertraline [Zoloft] 200 mg PO DAILY 04/27/23 [History]


traZODone HCL [Desyrel] 50 - 100 mg PO HS 04/27/23 [History]


HYDROcodone/APAP 7.5-325MG [Norco 7.5-325] 1 - 2 tab PO Q6H PRN #32 tab 05/02/23

[Rx]


Rizatriptan Benzoate [Maxalt] 10 mg PO TID PRN 05/02/23 [History]


Sennosides [Senokot] 2 tab PO DAILY PRN #60 tablet 05/02/23 [Rx]


Cyclobenzaprine [Flexeril] 10 mg PO HS PRN #30 tab 05/03/23 [Rx]








Follow up Appointment(s)/Referral(s): 


Neil Batres DO [Doctor of Osteopathic Medicine] - 2 Weeks


Activity/Diet/Wound Care/Special Instructions: 


Weightbearing as tolerated with walker.


Leave dressing intact.  Dressing may be removed by home care nurse or by patient

in 7 days. Then change dressing twice daily until follow up.


May shower with initial dressing intact and after removal.


If dressing become saturated, please remove.


Please resume Eliquis.


Recommend use of compression stockings daily until follow up to help prevent 

swelling and blood clots. May remove at night before sleeping. 


Please follow-up with [facility] in 2 weeks and call with any 

questions or concerns, [phone].





Discharge Disposition: HOME WITH HOME HEALTH SERVICES

## 2023-10-04 ENCOUNTER — HOSPITAL ENCOUNTER (OUTPATIENT)
Dept: HOSPITAL 47 - ORWHC2ENDO | Age: 68
Discharge: HOME | End: 2023-10-04
Attending: INTERNAL MEDICINE
Payer: MEDICARE

## 2023-10-04 VITALS — SYSTOLIC BLOOD PRESSURE: 132 MMHG | HEART RATE: 92 BPM | DIASTOLIC BLOOD PRESSURE: 60 MMHG

## 2023-10-04 VITALS — RESPIRATION RATE: 16 BRPM | TEMPERATURE: 97.2 F

## 2023-10-04 DIAGNOSIS — K63.5: Primary | ICD-10-CM

## 2023-10-04 DIAGNOSIS — Z96.653: ICD-10-CM

## 2023-10-04 DIAGNOSIS — Z79.890: ICD-10-CM

## 2023-10-04 DIAGNOSIS — Z79.899: ICD-10-CM

## 2023-10-04 DIAGNOSIS — I10: ICD-10-CM

## 2023-10-04 DIAGNOSIS — E07.9: ICD-10-CM

## 2023-10-04 DIAGNOSIS — Z88.5: ICD-10-CM

## 2023-10-04 DIAGNOSIS — Z88.9: ICD-10-CM

## 2023-10-04 DIAGNOSIS — Z79.891: ICD-10-CM

## 2023-10-04 DIAGNOSIS — Z79.01: ICD-10-CM

## 2023-10-04 DIAGNOSIS — K44.9: ICD-10-CM

## 2023-10-04 DIAGNOSIS — E78.5: ICD-10-CM

## 2023-10-04 DIAGNOSIS — Z86.2: ICD-10-CM

## 2023-10-04 DIAGNOSIS — K64.8: ICD-10-CM

## 2023-10-04 DIAGNOSIS — Z85.820: ICD-10-CM

## 2023-10-04 PROCEDURE — 88305 TISSUE EXAM BY PATHOLOGIST: CPT

## 2023-10-04 PROCEDURE — 45380 COLONOSCOPY AND BIOPSY: CPT

## 2023-10-04 NOTE — P.PCN
Date of Procedure: 10/04/23


Procedure(s) Performed: 


BRIEF HISTORY: Patient is a is a 68-year-old pleasant white female scheduled for

an elective colonoscopy as a part of evaluation of change in bowel habits for 

the last 1 year duration.  She is been having alternating diarrhea and 

constipation.





PROCEDURE PERFORMED: Colonoscopy with biopsy. 





PREOPERATIVE DIAGNOSIS: Change in bowel habits. 





IV sedation per Anesthesia. 





PROCEDURE: After informed consent was obtained, the patient, was brought into 

the endoscopy unit. IV sedation was administered by Anesthesia under continuous 

monitoring.  Digital rectal examination was normal. Initially the Olympus CF-160

flexible video colonoscope was then inserted in the rectum, gradually advanced 

into the cecum without any difficulty. Careful examination was performed as the 

scope was gradually being withdrawn. Ileocecal valve and the appendiceal orifice

were visualized and appeared normal.  Prep was excellent. Mucosa of the cecum, 

ascending colon, transverse colon, appeared normal.  In the descending colon 

there was a 3 mm sessile polyp that was removed by cold biopsy.  Rest of the 

descending colon, sigmoid colon, and rectum appeared normal. Retroflexion was 

performed in the rectum and all internal hemorrhoids were seen. The patient 

tolerated the procedure well. 





IMPRESSION: 


3 mm sessile descending colon polyp status post cold biopsy


Rest of the colon appeared normal


Small internal hemorrhoids





RECOMMENDATIONS:  Findings of this examination were discussed with the patient 

well as her family.  She was advised to follow with the biopsy results.  In the 

meantime suggested that she start on osmotic laxatives with MiraLAX every day 

and try to regulate her bowel movements.  Recommend repeat colonoscopy in 5 

years..

## 2024-01-08 ENCOUNTER — HOSPITAL ENCOUNTER (OUTPATIENT)
Dept: HOSPITAL 47 - RADUSWWP | Age: 69
Discharge: HOME | End: 2024-01-08
Attending: INTERNAL MEDICINE
Payer: MEDICARE

## 2024-01-08 DIAGNOSIS — R10.9: Primary | ICD-10-CM

## 2024-01-08 PROCEDURE — 76700 US EXAM ABDOM COMPLETE: CPT

## 2024-01-08 NOTE — US
EXAMINATION TYPE: US abdomen complete

 

DATE OF EXAM: 1/8/2024

 

COMPARISON: CT 4/18/23

 

CLINICAL INDICATION: Female, 68 years old with history of R10.9 UNSPECIFIED ABDOMINAL PAIN;

 

TECHNIQUE: Multiple sonographic images of the abdomen are obtained.

 

FINDINGS:

 

EXAM MEASUREMENTS:

 

Liver Length:  16.8 cm   

Gallbladder Wall:  0.27 cm   

CBD:  0.48 cm

Spleen:  8.0 x 9.8 x 4.9 cm   

Right Kidney:  11.4 x 4.5 x 4.2 cm 

Left Kidney:  10.6 x 4.4 x 4.7 cm   

 

SONOGRAPHER NOTES:

 

Pancreas:  Tail obscured by overlying bowel gas

Liver:  wnl  

Gallbladder:  wnl

**Evidence for sonographic Santiago's sign:  No

CBD:  wnl 

Spleen:  wnl   

Right Kidney:  wnl   

Left Kidney:  wnl   

Upper IVC:  wnl  

Abd Aorta:  Plaque noted throughout. 

 

 

IMPRESSION: 

 

1. No acute abdomen ultrasound abnormality.

## 2024-04-09 ENCOUNTER — HOSPITAL ENCOUNTER (OUTPATIENT)
Dept: HOSPITAL 47 - LABWHC1 | Age: 69
Discharge: HOME | End: 2024-04-09
Attending: INTERNAL MEDICINE
Payer: MEDICARE

## 2024-04-09 DIAGNOSIS — Z00.01: Primary | ICD-10-CM

## 2024-04-09 DIAGNOSIS — R73.9: ICD-10-CM

## 2024-04-09 DIAGNOSIS — I48.0: ICD-10-CM

## 2024-04-09 DIAGNOSIS — E78.00: ICD-10-CM

## 2024-04-09 LAB
ALBUMIN SERPL-MCNC: 4.1 G/DL (ref 3.8–4.9)
ALBUMIN/GLOB SERPL: 2.41 RATIO (ref 1.6–3.17)
ALP SERPL-CCNC: 69 U/L (ref 41–126)
ALT SERPL-CCNC: 21 U/L (ref 8–44)
ANION GAP SERPL CALC-SCNC: 8.6 MMOL/L (ref 4–12)
AST SERPL-CCNC: 28 U/L (ref 13–35)
BASOPHILS # BLD AUTO: 0.05 X 10*3/UL (ref 0–0.1)
BASOPHILS NFR BLD AUTO: 1 %
BUN SERPL-SCNC: 13.1 MG/DL (ref 9–27)
BUN/CREAT SERPL: 14.56 RATIO (ref 12–20)
CALCIUM SPEC-MCNC: 9.2 MG/DL (ref 8.7–10.3)
CHLORIDE SERPL-SCNC: 107 MMOL/L (ref 96–109)
CHOLEST SERPL-MCNC: 146 MG/DL (ref 0–200)
CO2 SERPL-SCNC: 27.4 MMOL/L (ref 21.6–31.8)
EOSINOPHIL # BLD AUTO: 0.14 X 10*3/UL (ref 0.04–0.35)
EOSINOPHIL NFR BLD AUTO: 2.9 %
ERYTHROCYTE [DISTWIDTH] IN BLOOD BY AUTOMATED COUNT: 4.35 X 10*6/UL (ref 4.1–5.2)
ERYTHROCYTE [DISTWIDTH] IN BLOOD: 12.8 % (ref 11.5–14.5)
GLOBULIN SER CALC-MCNC: 1.7 G/DL (ref 1.6–3.3)
GLUCOSE SERPL-MCNC: 91 MG/DL (ref 70–110)
HCT VFR BLD AUTO: 40.4 % (ref 37.2–46.3)
HDLC SERPL-MCNC: 56 MG/DL (ref 40–60)
HGB BLD-MCNC: 13.1 G/DL (ref 12–15)
IMM GRANULOCYTES BLD QL AUTO: 0.2 %
LDLC SERPL CALC-MCNC: 71.4 MG/DL (ref 0–131)
LYMPHOCYTES # SPEC AUTO: 1.66 X 10*3/UL (ref 0.9–5)
LYMPHOCYTES NFR SPEC AUTO: 33.8 %
MCH RBC QN AUTO: 30.1 PG (ref 27–32)
MCHC RBC AUTO-ENTMCNC: 32.4 G/DL (ref 32–37)
MCV RBC AUTO: 92.9 FL (ref 80–97)
MONOCYTES # BLD AUTO: 0.53 X 10*3/UL (ref 0.2–1)
MONOCYTES NFR BLD AUTO: 10.8 %
NEUTROPHILS # BLD AUTO: 2.52 X 10*3/UL (ref 1.8–7.7)
NEUTROPHILS NFR BLD AUTO: 51.3 %
NRBC BLD AUTO-RTO: 0 X 10*3/UL (ref 0–0.01)
PLATELET # BLD AUTO: 236 X 10*3/UL (ref 140–440)
POTASSIUM SERPL-SCNC: 4.2 MMOL/L (ref 3.5–5.5)
PROT SERPL-MCNC: 5.8 G/DL (ref 6.2–8.2)
SODIUM SERPL-SCNC: 143 MMOL/L (ref 135–145)
T4 FREE SERPL-MCNC: 1.13 NG/DL (ref 0.8–1.8)
TRIGL SERPL-MCNC: 93 MG/DL (ref 0–149)
VIT B12 SERPL-MCNC: 1078 PG/ML (ref 200–944)
VLDLC SERPL CALC-MCNC: 18.6 MG/DL (ref 5–40)
WBC # BLD AUTO: 4.91 X 10*3/UL (ref 4.5–10)

## 2024-04-09 PROCEDURE — 36415 COLL VENOUS BLD VENIPUNCTURE: CPT

## 2024-04-09 PROCEDURE — 84439 ASSAY OF FREE THYROXINE: CPT

## 2024-04-09 PROCEDURE — 80061 LIPID PANEL: CPT

## 2024-04-09 PROCEDURE — 83036 HEMOGLOBIN GLYCOSYLATED A1C: CPT

## 2024-04-09 PROCEDURE — 82306 VITAMIN D 25 HYDROXY: CPT

## 2024-04-09 PROCEDURE — 85025 COMPLETE CBC W/AUTO DIFF WBC: CPT

## 2024-04-09 PROCEDURE — 82607 VITAMIN B-12: CPT

## 2024-04-09 PROCEDURE — 84443 ASSAY THYROID STIM HORMONE: CPT

## 2024-04-09 PROCEDURE — 80053 COMPREHEN METABOLIC PANEL: CPT

## 2024-06-24 ENCOUNTER — HOSPITAL ENCOUNTER (OUTPATIENT)
Dept: HOSPITAL 47 - RADUSWWP | Age: 69
Discharge: HOME | End: 2024-06-24
Attending: FAMILY MEDICINE
Payer: MEDICARE

## 2024-06-24 DIAGNOSIS — R33.8: Primary | ICD-10-CM

## 2024-06-24 PROCEDURE — 76857 US EXAM PELVIC LIMITED: CPT

## 2024-06-24 NOTE — US
EXAMINATION TYPE: US bladder

 

DATE OF EXAM: 6/24/2024

 

COMPARISON: NONE

 

CLINICAL INDICATION: Female, 68 years old with history of R33.8 RETENTION OF URINE; Pt states she fee
ls she is unable to properly empty her bladder 

 

TECHNIQUE: Multiple sonographic images of the bladder are obtained.

 

FINDINGS:

 

EXAM MEASUREMENTS:

 

Pre Void Volume: 102 mL

Post Void Residual Volume:  32 mL

 

SONOGRAPHER NOTES:

 

Color Doppler performed to assess ureteral jets.

** Bilateral Jets seen:  No

 

** Normal Post Void Residual (less than 50ml):  Yes

 

 

 

IMPRESSION:  

1.  No evidence for acute process. 

2.  Post void residual as above..

## 2024-07-23 ENCOUNTER — HOSPITAL ENCOUNTER (EMERGENCY)
Dept: HOSPITAL 47 - EC | Age: 69
Discharge: HOME | End: 2024-07-23
Payer: MEDICARE

## 2024-07-23 VITALS — DIASTOLIC BLOOD PRESSURE: 68 MMHG | HEART RATE: 71 BPM | SYSTOLIC BLOOD PRESSURE: 106 MMHG | TEMPERATURE: 97.9 F

## 2024-07-23 VITALS — RESPIRATION RATE: 18 BRPM

## 2024-07-23 DIAGNOSIS — Z88.8: ICD-10-CM
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
## 2024-07-23 DIAGNOSIS — S52.612A: Primary | ICD-10-CM

## 2024-07-23 DIAGNOSIS — Z88.5: ICD-10-CM

## 2024-07-23 DIAGNOSIS — S52.572A: ICD-10-CM

## 2024-07-23 DIAGNOSIS — Z87.891: ICD-10-CM

## 2024-07-23 PROCEDURE — 73130 X-RAY EXAM OF HAND: CPT

## 2024-07-23 PROCEDURE — 73090 X-RAY EXAM OF FOREARM: CPT

## 2024-07-23 PROCEDURE — 73110 X-RAY EXAM OF WRIST: CPT

## 2024-07-23 PROCEDURE — 99284 EMERGENCY DEPT VISIT MOD MDM: CPT

## 2024-07-23 PROCEDURE — 29125 APPL SHORT ARM SPLINT STATIC: CPT

## 2024-07-23 RX ADMIN — HYDROCODONE BITARTRATE AND ACETAMINOPHEN ONE EACH: 10; 325 TABLET ORAL at 21:00

## 2024-07-23 RX ADMIN — LIDOCAINE HYDROCHLORIDE ONE ML: 10 INJECTION, SOLUTION INFILTRATION; PERINEURAL at 21:20

## 2024-07-23 NOTE — ED
Upper Extremity HPI





- General


Source: patient, RN notes reviewed





<Lorenza Michelle - Last Filed: 07/23/24 18:03>





<Alexander Marie - Last Filed: 07/24/24 00:24>





- General


Stated Complaint: Fall L Wrist Injury


Time Seen by Provider: 07/23/24 17:51





- History of Present Illness


Initial Comments: 





Quick Note-is a 68-year-old female presents emergency department chief complaint

of a fall.  Patient was outside when she tripped falling on her outstretched 

left wrist.  Denies presyncopal symptoms before time of the fall, states that 

she tripped.  She denies hitting her head or loss of consciousness.  No blood 

thinner use.  Patient has mild paresthesias to the left distal digits. 

(Lorenza Michelle)


68-year-old female presenting with chief complaint of left wrist pain.  Patient 

tripped and fell back onto her outstretched hand and states that all of her 

weight was on the left side.  She denies any head injury or loss of 

consciousness.  No blood thinner use.  She has full sensation of the fingers, 

states that she can move them but is quite painful to do so.  She has previously

[facility]. (Alexander Marie)





- Related Data


                                Home Medications











 Medication  Instructions  Recorded  Confirmed


 


Nabumetone [Relafen] 1,500 mg PO QAM 05/02/16 10/02/23


 


Liothyronine Sodium [Cytomel] 10 mcg PO QAM 08/31/16 10/02/23


 


Apixaban [Eliquis] 5 mg PO BID 07/28/21 10/02/23


 


Levothyroxine Sodium [Synthroid] 75 mcg PO DAILY 07/28/21 10/02/23


 


Metoprolol Succinate [Toprol XL] 100 mg PO QAM 07/28/21 10/02/23


 


Cyclobenzaprine [Flexeril] 5 mg PO DAILY PRN 04/27/23 10/02/23


 


Dapagliflozin Propanediol [Farxiga] 10 mg PO HS 04/27/23 10/02/23


 


Estradiol Cream [Estrace Cream 1 gm VAGINAL AS DIRECTED 04/27/23 10/02/23





0.01%]   


 


Levothyroxine Sodium [Synthroid] 0.5 tab PO TUFR 04/27/23 10/02/23


 


Multivit-Min/Iron/Folic/Lutein 1 each PO DAILY 04/27/23 10/02/23





[Centrum Silver Women Tablet]   


 


Omeprazole Magnesium [PriLOSEC OTC] 20 mg PO QAM 04/27/23 10/02/23


 


Sacubitril/Valsartan [Entresto 49 1 each PO BID 04/27/23 10/02/23





mg-51 mg Tablet]   


 


traZODone HCL [Desyrel] 100 mg PO HS 04/27/23 10/02/23


 


Rizatriptan Benzoate [Maxalt] 10 mg PO TID PRN 05/02/23 10/02/23


 


Furosemide [Lasix] 20 mg PO QAM 10/02/23 10/02/23


 


HYDROcodone/APAP 10-325MG [Norco 1 tab PO DAILY 10/02/23 10/02/23





]   











                                    Allergies











Allergy/AdvReac Type Severity Reaction Status Date / Time


 


gabapentin Allergy Unknown Rash/Hives, Verified 10/04/23 10:22





   itching  


 


codeine Allergy  Nausea & Verified 07/23/24 18:21





   Vomiting  














Review of Systems


ROS Other: All systems not noted in ROS Statement are negative.





<Lorenza Michelle - Last Filed: 07/23/24 18:03>


ROS Other: All systems not noted in ROS Statement are negative.





<Alexander Marie - Last Filed: 07/24/24 00:24>


ROS Statement: 


Those systems with pertinent positive or pertinent negative responses have been 

documented in the HPI.








Past Medical History


Past Medical History: Asthma, Cancer, Fibromyalgia, Hyperlipidemia, 

Hypertension, Osteoarthritis (OA), Thyroid Disorder


Additional Past Medical History / Comment(s): POTS, MTHFR mutation, hx melanoma 

(BACK), AICD, Hiatal hernia (recurred after surgery), diverticulitis, 

hypothyroid.


History of Any Multi-Drug Resistant Organisms: None Reported


Past Surgical History: AICD, Back Surgery, Heart Catheterization, Hysterectomy, 

Joint Replacement, Orthopedic Surgery


Additional Past Surgical History / Comment(s): bilateral knee replacement, and 

bilateral ROTATOR CUFFshoulders, joint rt thumb replaced and revised.  Heart 

cath 15 years ago with no stents. Right knee revision in 2016. Back surgery 5 

years ago. Previous pain injections.


Past Anesthesia/Blood Transfusion Reactions: Previous Problems w/ Anesthesia, 

Postoperative Nausea & Vomiting (PONV)


Additional Past Anesthesia/Blood Transfusion Reaction / Comment(s): POTS episode

during anesthesia (severe drop in BP after previous knee replacement surgery)


Type of Cardiac Device: AICD


Device Placement Date:: 6/2016


Past Psychological History: Depression


Smoking Status: Former smoker


Past Alcohol Use History: Occasional


Additional Past Alcohol Use History / Comment(s): quit smoking 2016. Smoke off 

and on for 30 years < 1/2 PPD


Past Drug Use History: None Reported


Additional Drug Use History / Comment(s): edible marijuana, last had last week





- Past Family History


  ** Sister(s)


Family Medical History: Cancer, CVA/TIA





  ** Brother(s)


Family Medical History: Cancer, Hypertension





<Lorenza Michelle - Last Filed: 07/23/24 18:03>





General Exam





<Lorenza Michelle - Last Filed: 07/23/24 18:03>


Limitations: no limitations


General appearance: alert, in no apparent distress


Head exam: Present: atraumatic, normocephalic


Eye exam: Present: normal appearance, EOMI


Neck exam: Present: normal inspection.  Absent: meningismus


Respiratory exam: Absent: respiratory distress


Cardiovascular Exam: Present: regular rate


  ** Left


Hand Wrist exam: Present: tenderness, swelling, deformity.  Absent: full ROM


Vascular: Absent: vascular compromise


Neurological exam: Present: alert, oriented X3


Psychiatric exam: Present: normal affect, normal mood


Skin exam: Present: warm, dry, abrasion





<Alexander Marie - Last Filed: 07/24/24 00:24>





- General Exam Comments


Initial Comments: 


Visual Physical Exam


 


Vital signs reviewed


 


General: Well-appearing, nontoxic, no acute distress.


Head: Normocephalic, atraumatic


Eyes: PERRLA, EOMI


ENT: Airway patent


Chest: Nonlabored breathing


Skin: No visual rash, normal skin tone


Neuro: Alert and oriented 3


Musculoskeletal: No gross abnormalities





 (TeressaeleLorenza rios)





Course


                                   Vital Signs











  07/23/24 07/23/24





  18:18 22:27


 


Temperature 97.8 F 97.9 F


 


Pulse Rate 64 71


 


Respiratory 18 18





Rate  


 


Blood Pressure 101/66 106/68


 


O2 Sat by Pulse 97 98





Oximetry  














Procedures





- Orthopedic Splinting/Casting


  ** Injury #1


Side: left


Upper Extremity Injury Location: wrist


Upper Extremity Immobilizer: sugar tong splint





<Alexander Marie - Last Filed: 07/24/24 00:24>





Medical Decision Making





<Lorenza Michelle - Last Filed: 07/23/24 18:03>





<Alexander Marie - Last Filed: 07/24/24 00:24>





- Medical Decision Making


I completed the quick note portion of this chart signed Lorenza Michelle PA-C


 (Lorenza Michelle)


Was pt. sent in by a medical professional or institution (NARDA Hicks, NP, urgent 

care, hospital, or nursing home...) When possible be specific


@  -No


Did you speak to anyone other than the patient for history (EMS, parent, family,

police, friend...)? What history was obtained from this source 


@  -No


Did you review nursing and triage notes (agree or disagree)?  Why? 


@  -I reviewed and agree with nursing and triage notes


Were old charts reviewed (outside hosp., previous admission, EMS record, old 

EKG, old radiological studies, urgent care reports/EKG's, nursing home records)?

Report findings 


@  -No old charts were reviewed


Differential Diagnosis (chest pain, altered mental status, abdominal pain women,

abdominal pain men, vaginal bleeding, weakness, fever, dyspnea, syncope, 

headache, dizziness, GI bleed, back pain, seizure, CVA, palpatations, mental 

health, musculoskeletal)? 


@  -Differential includes fracture, dislocation, sprain, strain, this is not an 

all-inclusive list


EKG interpreted by me (3pts min.).


@  -As above


X-rays interpreted by me (1pt min.).


@  -X-ray shows acute fracture of the distal radius with intra-articular 

extension and dorsal angulation.  There is associated ulnar styloid process 

fracture.  Diffuse soft tissue swelling present.


CT interpreted by me (1pt min.).


@  -None done


U/S interpreted by me (1pt. min.).


@  -None done


What testing was considered but not performed or refused? (CT, X-rays, U/S, 

labs)? Why?


@  -None


What meds were considered but not given or refused? Why?


@  -None


Did you discuss the management of the patient with other professionals 

(professionals i.e. NARDA Hicks, NP, lab, RT, psych nurse, , , 

teacher, , )? Give summary


@  -No


Was smoking cessation discussed for >3mins.?


@  -No


Was critical care preformed (if so, how long)?


@  -No


Were there social determinants of health that impacted care today? How? 

(Homelessness, low income, unemployed, alcoholism, drug addiction, 

transportation, low edu. Level, literacy, decrease access to med. care, penitentiary, 

rehab)?


@  -No


Was there de-escalation of care discussed even if they declined (Discuss DNR or 

withdrawal of care, Hospice)? DNR status


@  -No


What co-morbidities impacted this encounter? (DM, HTN, Smoking, COPD, CAD, 

Cancer, CVA, ARF, Chemo, Hep., AIDS, mental health diagnosis, sleep apnea, 

morbid obesity)?


@  -None


Was patient admitted / discharged? Hospital course, mention meds given and 

route, prescriptions, significant lab abnormalities, going to OR and other 

pertinent info.


@  -68-year-old female presenting with chief complaint of left wrist pain after 

falling onto her outstretched hand.  X-ray shows evidence of distal radius 

fracture as well as ulnar styloid process fracture.  Hematoma block was 

performed by my attending Dr. Crocker.  Gentle traction was used to help 

better align the fracture.  Sugar-tong splint was placed.  Patient will follow-

up with orthopedics.  Discharged.  Follow-up with PCP.  Report back to ER with 

any new or worsening symptoms.  Discussed return parameters and answered all 

questions.  Patient conveyed verbal understanding and agreed to the plan.  I 

discussed this case in detail with my attending Dr. Crocker


Undiagnosed new problem with uncertain prognosis?


@  -No


Drug Therapy requiring intensive monitoring for toxicity (Heparin, Nitro, 

Insulin, Cardizem)?


@  -No


Were any procedures done?


@  -Hematoma block, splint applied


Diagnosis/symptom?


@  -Distal radius fracture, ulnar styloid process fracture


Acute, or Chronic, or Acute on Chronic?


@  -Acute


Uncomplicated (without systemic symptoms) or Complicated (systemic symptoms)?


@  -uncomplicated


Side effects of treatment?


@  -No


Exacerbation, Progression, or Severe Exacerbation?


@  -No


Poses a threat to life or bodily function? How? (Chest pain, USA, MI, pneumonia,

PE, COPD, DKA, ARF, appy, cholecystitis, CVA, Diverticulitis, Homicidal, 

Suicidal, threat to staff... and all critical care pts)


@  -Low likelihood


 (Alexander Marie)





Disposition





<Lorenza Michelle - Last Filed: 07/23/24 18:03>


Is patient prescribed a controlled substance at d/c from ED?: No


Time of Disposition: 22:12





<Alexander Marie - Last Filed: 07/24/24 00:24>


Clinical Impression: 


 Distal radius fracture





Disposition: HOME SELF-CARE


Condition: Good


Instructions (If sedation given, give patient instructions):  Wrist Fracture in 

Adults (ED)


Additional Instructions: 


Follow-up with orthopedics.  Report back to ER with any new or worsening 

symptoms.


Referrals: 


Keira Fraser MD [Primary Care Provider] - 1-2 days


Casey Santiago MD [STAFF PHYSICIAN] - 1-2 days

## 2024-07-23 NOTE — XR
EXAMINATION TYPE: XR hand complete LT, XR wrist complete LT, XR forearm LT

 

DATE OF EXAM: 7/23/2024 7:50 PM

 

CLINICAL INDICATION:Female, 68 years old with history of fall, injury, deformity; PH[facility]

 

COMPARISON: None

 

TECHNIQUE: XR hand complete LT, XR wrist complete LT, XR forearm LT Frontal, lateral and oblique view
s were obtained. Frontal and lateral views of the forearm

 

FINDINGS/IMPRESSION: 

Acute fracture of the distal radius with intra-articular extension and dorsal angulation. There is as
sociated ulnar styloid process fracture. Diffuse soft tissue swelling present.

## 2024-07-24 ENCOUNTER — HOSPITAL ENCOUNTER (EMERGENCY)
Dept: HOSPITAL 47 - EC | Age: 69
Discharge: HOME | End: 2024-07-24
Payer: MEDICARE

## 2024-07-24 VITALS — TEMPERATURE: 97.9 F | SYSTOLIC BLOOD PRESSURE: 121 MMHG | HEART RATE: 78 BPM | DIASTOLIC BLOOD PRESSURE: 74 MMHG

## 2024-07-24 VITALS — RESPIRATION RATE: 18 BRPM

## 2024-07-24 DIAGNOSIS — Z88.5: ICD-10-CM

## 2024-07-24 DIAGNOSIS — S52.502A: Primary | ICD-10-CM

## 2024-07-24 DIAGNOSIS — Z88.8: ICD-10-CM

## 2024-07-24 DIAGNOSIS — S52.612A: ICD-10-CM

## 2024-07-24 DIAGNOSIS — Z87.891: ICD-10-CM
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
## 2024-07-24 PROCEDURE — 99283 EMERGENCY DEPT VISIT LOW MDM: CPT

## 2024-07-24 NOTE — ED
Recheck HPI





- General


Chief Complaint: Recheck/Abnormal Lab/Rx


Stated Complaint: re-check, tight cast


Time Seen by Provider: 07/24/24 02:55


Source: patient


Mode of arrival: ambulatory


Limitations: no limitations





- History of Present Illness


Initial Comments: 


68-year-old female presenting for reevaluation.  Patient was seen here earlier 

today, x-ray showed distal radius fracture and ulnar styloid fracture.  She is 

placed in a sugar-tong splint and instructed to follow-up with orthopedics.  

Patient returning tonight because she feels numbness in the thumb and pointer 

finger.  No numbness in the third through fifth fingers.  She has normal range 

of motion of the fingers.  She was worried that her splint is too tight, she 

tried on wrapping it and rewrapping it but this did not seem to help.








- Related Data


                                Home Medications











 Medication  Instructions  Recorded  Confirmed


 


Nabumetone [Relafen] 1,500 mg PO QAM 05/02/16 10/02/23


 


Liothyronine Sodium [Cytomel] 10 mcg PO QAM 08/31/16 10/02/23


 


Apixaban [Eliquis] 5 mg PO BID 07/28/21 10/02/23


 


Levothyroxine Sodium [Synthroid] 75 mcg PO DAILY 07/28/21 10/02/23


 


Metoprolol Succinate [Toprol XL] 100 mg PO QAM 07/28/21 10/02/23


 


Cyclobenzaprine [Flexeril] 5 mg PO DAILY PRN 04/27/23 10/02/23


 


Dapagliflozin Propanediol [Farxiga] 10 mg PO HS 04/27/23 10/02/23


 


Estradiol Cream [Estrace Cream 1 gm VAGINAL AS DIRECTED 04/27/23 10/02/23





0.01%]   


 


Levothyroxine Sodium [Synthroid] 0.5 tab PO TUFR 04/27/23 10/02/23


 


Multivit-Min/Iron/Folic/Lutein 1 each PO DAILY 04/27/23 10/02/23





[Centrum Silver Women Tablet]   


 


Omeprazole Magnesium [PriLOSEC OTC] 20 mg PO QAM 04/27/23 10/02/23


 


Sacubitril/Valsartan [Entresto 49 1 each PO BID 04/27/23 10/02/23





mg-51 mg Tablet]   


 


traZODone HCL [Desyrel] 100 mg PO HS 04/27/23 10/02/23


 


Rizatriptan Benzoate [Maxalt] 10 mg PO TID PRN 05/02/23 10/02/23


 


Furosemide [Lasix] 20 mg PO QAM 10/02/23 10/02/23


 


HYDROcodone/APAP 10-325MG [Norco 1 tab PO DAILY 10/02/23 10/02/23





]   











                                    Allergies











Allergy/AdvReac Type Severity Reaction Status Date / Time


 


gabapentin Allergy Unknown Rash/Hives, Verified 07/24/24 02:53





   itching  


 


codeine Allergy  Nausea & Verified 07/24/24 02:53





   Vomiting  














Review of Systems


ROS Statement: 


Those systems with pertinent positive or pertinent negative responses have been 

documented in the HPI.





ROS Other: All systems not noted in ROS Statement are negative.





Past Medical History


Past Medical History: Asthma, Cancer, Fibromyalgia, Hyperlipidemia, 

Hypertension, Osteoarthritis (OA), Thyroid Disorder


Additional Past Medical History / Comment(s): POTS, MTHFR mutation, hx melanoma 

(BACK), AICD, Hiatal hernia (recurred after surgery), diverticulitis, 

hypothyroid.


History of Any Multi-Drug Resistant Organisms: None Reported


Past Surgical History: AICD, Back Surgery, Heart Catheterization, Hysterectomy, 

Joint Replacement, Orthopedic Surgery


Additional Past Surgical History / Comment(s): bilateral knee replacement, and 

bilateral ROTATOR CUFFshoulders, joint rt thumb replaced and revised.  Heart 

cath 15 years ago with no stents. Right knee revision in 2016. Back surgery 5 

years ago. Previous pain injections.


Past Anesthesia/Blood Transfusion Reactions: Previous Problems w/ Anesthesia, 

Postoperative Nausea & Vomiting (PONV)


Additional Past Anesthesia/Blood Transfusion Reaction / Comment(s): POTS episode

during anesthesia (severe drop in BP after previous knee replacement surgery)


Type of Cardiac Device: AICD


Device Placement Date:: 6/2016


Past Psychological History: Depression


Smoking Status: Former smoker


Past Alcohol Use History: Occasional


Past Drug Use History: Marijuana





- Past Family History


  ** Sister(s)


Family Medical History: Cancer, CVA/TIA





  ** Brother(s)


Family Medical History: Cancer, Hypertension





General Exam


Limitations: no limitations


General appearance: alert, in no apparent distress


Head exam: Present: atraumatic, normocephalic


Eye exam: Present: normal appearance, EOMI


Neck exam: Present: normal inspection.  Absent: meningismus


Cardiovascular Exam: Present: regular rate


  ** Left


Hand Wrist exam: Present: tenderness, swelling


Vascular: Present: radial pulse (2+).  Absent: vascular compromise


Neurological exam: Present: alert, oriented X3


Psychiatric exam: Present: normal affect, normal mood


Skin exam: Present: warm, dry





Course


                                   Vital Signs











  07/24/24 07/24/24





  02:52 03:27


 


Temperature 98.1 F 97.9 F


 


Pulse Rate 61 78


 


Respiratory 18 18





Rate  


 


Blood Pressure 120/73 121/74


 


O2 Sat by Pulse 97 98





Oximetry  














Medical Decision Making





- Medical Decision Making


Was pt. sent in by a medical professional or institution (, PA, NP, urgent 

care, hospital, or nursing home...) When possible be specific


@  -No


Did you speak to anyone other than the patient for history (EMS, parent, family,

police, friend...)? What history was obtained from this source 


@  -No


Did you review nursing and triage notes (agree or disagree)?  Why? 


@  -I reviewed and agree with nursing and triage notes


Were old charts reviewed (outside hosp., previous admission, EMS record, old 

EKG, old radiological studies, urgent care reports/EKG's, nursing home records)?

Report findings 


@  -No old charts were reviewed


Differential Diagnosis (chest pain, altered mental status, abdominal pain women,

abdominal pain men, vaginal bleeding, weakness, fever, dyspnea, syncope, 

headache, dizziness, GI bleed, back pain, seizure, CVA, palpatations, mental h

ealth, musculoskeletal)? 


@  -Differential Musculoskeletal


Muscular strain, contusion, ligament sprain, fracture, arthritis, septic 

arthritis, bursitis, cellulitis, muscle spasm, nerve compression, DVT, arterial 

occlusion, herpes zoster, electrolyte abnormality, tumor.... This is not meant 

to be in all inclusive list


EKG interpreted by me (3pts min.).


@  -As above


X-rays interpreted by me (1pt min.).


@  -None done


CT interpreted by me (1pt min.).


@  -None done


U/S interpreted by me (1pt. min.).


@  -None done


What testing was considered but not performed or refused? (CT, X-rays, U/S, 

labs)? Why?


@  -None


What meds were considered but not given or refused? Why?


@  -None


Did you discuss the management of the patient with other professionals 

(professionals i.e. , PA, NP, lab, RT, psych nurse, , , 

teacher, , )? Give summary


@  -No


Was smoking cessation discussed for >3mins.?


@  -No


Was critical care preformed (if so, how long)?


@  -No


Were there social determinants of health that impacted care today? How? 

(Homelessness, low income, unemployed, alcoholism, drug addiction, 

transportation, low edu. Level, literacy, decrease access to med. care, [facility], 

rehab)?


@  -No


Was there de-escalation of care discussed even if they declined (Discuss DNR or 

withdrawal of care, Hospice)? DNR status


@  -No


What co-morbidities impacted this encounter? (DM, HTN, Smoking, COPD, CAD, 

Cancer, CVA, ARF, Chemo, Hep., AIDS, mental health diagnosis, sleep apnea, 

morbid obesity)?


@  -None


Was patient admitted / discharged? Hospital course, mention meds given and 

route, prescriptions, significant lab abnormalities, going to OR and other 

pertinent info.


@  -68-year-old female presenting with chief complaint of numbness in her thumb 

and pointer finger.  Earlier today placed in a sugar-tong splint after 

sustaining a distal radius fracture and ulnar styloid process fracture.  2+ 

pulse, fingers are warm.  She is having some paresthesias over the thumb and po

inter finger.  Suspect this is due to the joint involvement as well as a 

significant amount of swelling to area.  Normal range of motion of the fingers. 

Sugar-tong splint was reapplied and loosely wrapped.  Follow-up orthopedics.  

Discharged follow-up with PCP.  Report back to ER with any new or worsening 

symptoms.  Discussed return parameters and answered all questions.  Patient 

conveyed verbal understanding and agreed to the plan.  I discussed this case in 

detail with my attending Dr. Perez


Undiagnosed new problem with uncertain prognosis?


@  -No


Drug Therapy requiring intensive monitoring for toxicity (Heparin, Nitro, 

Insulin, Cardizem)?


@  -No


Were any procedures done?


@  -Splint applied


Diagnosis/symptom?


@  -Distal radius fracture


Acute, or Chronic, or Acute on Chronic?


@  -Acute


Uncomplicated (without systemic symptoms) or Complicated (systemic symptoms)?


@  -Uncomplicated


Side effects of treatment?


@  -No


Exacerbation, Progression, or Severe Exacerbation?


@  -No


Poses a threat to life or bodily function? How? (Chest pain, USA, MI, pneumonia,

PE, COPD, DKA, ARF, appy, cholecystitis, CVA, Diverticulitis, Homicidal, 

Suicidal, threat to staff... and all critical care pts)


@  -No











Disposition


Clinical Impression: 


 Distal radius fracture





Disposition: HOME SELF-CARE


Condition: Good


Additional Instructions: 


Follow-up with orthopedics.  Report back to ER with any new or worsening 

symptoms.


Is patient prescribed a controlled substance at d/c from ED?: No


Referrals: 


None,Stated [Primary Care Provider] - 1-2 days


Casey Santiago MD [STAFF PHYSICIAN] - 1-2 days


Time of Disposition: 03:20

## 2024-07-29 ENCOUNTER — HOSPITAL ENCOUNTER (OUTPATIENT)
Dept: HOSPITAL 47 - RADCTMAIN | Age: 69
Discharge: HOME | End: 2024-07-29
Attending: ORTHOPAEDIC SURGERY
Payer: MEDICARE

## 2024-07-29 DIAGNOSIS — S52.502A: ICD-10-CM

## 2024-07-29 DIAGNOSIS — S52.615A: ICD-10-CM

## 2024-07-29 DIAGNOSIS — S52.532A: ICD-10-CM

## 2024-07-29 DIAGNOSIS — S52.612A: Primary | ICD-10-CM

## 2024-07-29 NOTE — CT
EXAMINATION TYPE: CT wrist LT wo con

 

DATE OF EXAM: 7/29/2024

 

COMPARISON: Radiographs 7/23/2024

 

HISTORY: 68-year-old female S52.612A DISP FX OF LEFT ULNA STYLOID PROCESS, INI,  DISP FX OF LEFT ULNA
 STYLOID PROCESS SX PLANNING

 

TECHNIQUE: CT of the left wrist without IV contrast. Coronal and sagittal reconstructions performed. 
3-D reconstructions generated on a dedicated independent workstation.

 

CT DLP: 145.90 mGycm

Automated exposure control for dose reduction was used.

 

FINDINGS: 

Comminuted fracture of the distal radial metaphysis and epiphysis is redemonstrated. Impaction measur
ing up to 8 mm. There is radial displacement of the radial styloid process fracture fragment by 6 mm.
 Dorsal displacement of the Edenilson's tubercle fracture fragment by up to 5 mm.

 

The changes result in a articular surface defect measuring up to 7 mm wide especially along the mid d
orsal aspect of the radial articular surface.

 

Fracture extension also into the distal radioulnar joint. Nondisplaced transverse fracture at the bas
e of the ulnar styloid process also noted.

 

Moderate to severe degenerative change at the first CMC joint. Mild degenerative change triscaphe allen
nt. Overlying plaster cast.

 

IMPRESSION: 

 

1. COMMINUTED AND IMPACTED FRACTURE DISTAL RADIAL METAPHYSIS AND EPIPHYSIS AS OUTLINED ABOVE. INTRA-A
RTICULAR EXTENSION AT THE RADIOCARPAL AND DISTAL RADIOULNAR JOINTS.

2. WHILE THERE IS NO SIGNIFICANT ARTICULAR SURFACE INCONGRUENCE, THE MILD DISPLACEMENT AND IMPACTION 
RESULTS IN A 7 MM WIDE DEFECT OF THE RADIAL ARTICULAR SURFACE ALONG THE MID DORSAL ASPECT.

3. MODERATE TO SEVERE OA AT THE BASAL JOINT OF THE THUMB.

4. NONDISPLACED TRANSVERSE FRACTURE THROUGH THE BASE OF THE ULNAR STYLOID PROCESS.

## 2024-10-04 ENCOUNTER — HOSPITAL ENCOUNTER (OUTPATIENT)
Dept: HOSPITAL 47 - LABWHC1 | Age: 69
Discharge: HOME | End: 2024-10-04
Attending: INTERNAL MEDICINE
Payer: MEDICARE

## 2024-10-04 DIAGNOSIS — I48.0: ICD-10-CM

## 2024-10-04 DIAGNOSIS — M48.061: ICD-10-CM

## 2024-10-04 DIAGNOSIS — I50.22: Primary | ICD-10-CM

## 2024-10-04 DIAGNOSIS — L65.0: ICD-10-CM

## 2024-10-04 DIAGNOSIS — M46.98: ICD-10-CM

## 2024-10-04 DIAGNOSIS — E03.9: ICD-10-CM

## 2024-10-04 LAB
ALBUMIN SERPL-MCNC: 4.3 G/DL (ref 3.8–4.9)
ALBUMIN/GLOB SERPL: 2.05 RATIO (ref 1.6–3.17)
ALP SERPL-CCNC: 82 U/L (ref 41–126)
ALT SERPL-CCNC: 20 U/L (ref 8–44)
ANION GAP SERPL CALC-SCNC: 13.2 MMOL/L (ref 4–12)
AST SERPL-CCNC: 21 U/L (ref 13–35)
BASOPHILS # BLD AUTO: 0.04 X 10*3/UL (ref 0–0.1)
BASOPHILS NFR BLD AUTO: 0.4 %
BUN SERPL-SCNC: 16 MG/DL (ref 9–27)
BUN/CREAT SERPL: 17.78 RATIO (ref 12–20)
CALCIUM SPEC-MCNC: 9.2 MG/DL (ref 8.7–10.3)
CHLORIDE SERPL-SCNC: 104 MMOL/L (ref 96–109)
CO2 SERPL-SCNC: 22.8 MMOL/L (ref 21.6–31.8)
EOSINOPHIL # BLD AUTO: 0.23 X 10*3/UL (ref 0.04–0.35)
EOSINOPHIL NFR BLD AUTO: 2.5 %
ERYTHROCYTE [DISTWIDTH] IN BLOOD BY AUTOMATED COUNT: 4.66 X 10*6/UL (ref 4.1–5.2)
ERYTHROCYTE [DISTWIDTH] IN BLOOD: 12.7 % (ref 11.5–14.5)
GLOBULIN SER CALC-MCNC: 2.1 G/DL (ref 1.6–3.3)
GLUCOSE SERPL-MCNC: 111 MG/DL (ref 70–110)
HCT VFR BLD AUTO: 43.4 % (ref 37.2–46.3)
HGB BLD-MCNC: 14.2 G/DL (ref 12–15)
IMM GRANULOCYTES BLD QL AUTO: 0.4 %
LYMPHOCYTES # SPEC AUTO: 1.29 X 10*3/UL (ref 0.9–5)
LYMPHOCYTES NFR SPEC AUTO: 14.3 %
MAGNESIUM SPEC-SCNC: 2.1 MG/DL (ref 1.5–2.4)
MCH RBC QN AUTO: 30.5 PG (ref 27–32)
MCHC RBC AUTO-ENTMCNC: 32.7 G/DL (ref 32–37)
MCV RBC AUTO: 93.1 FL (ref 80–97)
MONOCYTES # BLD AUTO: 1.2 X 10*3/UL (ref 0.2–1)
MONOCYTES NFR BLD AUTO: 13.3 %
NEUTROPHILS # BLD AUTO: 6.22 X 10*3/UL (ref 1.8–7.7)
NEUTROPHILS NFR BLD AUTO: 69.1 %
NRBC BLD AUTO-RTO: 0 X 10*3/UL (ref 0–0.01)
NT-PROBNP SERPL-MCNC: 140 PG/ML (ref 0–125)
PLATELET # BLD AUTO: 281 X 10*3/UL (ref 140–440)
POTASSIUM SERPL-SCNC: 4 MMOL/L (ref 3.5–5.5)
PROT SERPL-MCNC: 6.4 G/DL (ref 6.2–8.2)
SODIUM SERPL-SCNC: 140 MMOL/L (ref 135–145)
T4 FREE SERPL-MCNC: 1.33 NG/DL (ref 0.8–1.8)
URATE SERPL-MCNC: 3.4 MG/DL (ref 2.9–7.7)
VIT B12 SERPL-MCNC: 883 PG/ML (ref 200–944)
WBC # BLD AUTO: 9.02 X 10*3/UL (ref 4.5–10)

## 2024-10-04 PROCEDURE — 86618 LYME DISEASE ANTIBODY: CPT

## 2024-10-04 PROCEDURE — 84443 ASSAY THYROID STIM HORMONE: CPT

## 2024-10-04 PROCEDURE — 85652 RBC SED RATE AUTOMATED: CPT

## 2024-10-04 PROCEDURE — 86038 ANTINUCLEAR ANTIBODIES: CPT

## 2024-10-04 PROCEDURE — 84403 ASSAY OF TOTAL TESTOSTERONE: CPT

## 2024-10-04 PROCEDURE — 82306 VITAMIN D 25 HYDROXY: CPT

## 2024-10-04 PROCEDURE — 82607 VITAMIN B-12: CPT

## 2024-10-04 PROCEDURE — 80053 COMPREHEN METABOLIC PANEL: CPT

## 2024-10-04 PROCEDURE — 85025 COMPLETE CBC W/AUTO DIFF WBC: CPT

## 2024-10-04 PROCEDURE — 86140 C-REACTIVE PROTEIN: CPT

## 2024-10-04 PROCEDURE — 86812 HLA TYPING A B OR C: CPT

## 2024-10-04 PROCEDURE — 83735 ASSAY OF MAGNESIUM: CPT

## 2024-10-04 PROCEDURE — 83880 ASSAY OF NATRIURETIC PEPTIDE: CPT

## 2024-10-04 PROCEDURE — 86200 CCP ANTIBODY: CPT

## 2024-10-04 PROCEDURE — 84550 ASSAY OF BLOOD/URIC ACID: CPT

## 2024-10-04 PROCEDURE — 82626 DEHYDROEPIANDROSTERONE: CPT

## 2024-10-04 PROCEDURE — 36415 COLL VENOUS BLD VENIPUNCTURE: CPT

## 2024-10-04 PROCEDURE — 84439 ASSAY OF FREE THYROXINE: CPT

## 2024-10-05 LAB — HLA-B27 QL: NEGATIVE

## 2024-10-07 LAB — B BURGDOR AB SER-IMP: 0.08

## 2024-10-08 LAB — CCP IGG SERPL-ACNC: <1.5 U/ML (ref ?–3.9)

## 2025-04-04 ENCOUNTER — HOSPITAL ENCOUNTER (OUTPATIENT)
Dept: HOSPITAL 47 - RADUSWWP | Age: 70
Discharge: HOME | End: 2025-04-04
Attending: SURGERY
Payer: MEDICARE

## 2025-04-04 DIAGNOSIS — K44.9: Primary | ICD-10-CM

## 2025-04-04 DIAGNOSIS — R13.10: ICD-10-CM

## 2025-04-04 PROCEDURE — 76705 ECHO EXAM OF ABDOMEN: CPT

## 2025-04-04 PROCEDURE — 74220 X-RAY XM ESOPHAGUS 1CNTRST: CPT

## 2025-04-08 ENCOUNTER — HOSPITAL ENCOUNTER (OUTPATIENT)
Dept: HOSPITAL 47 - RADNMMAIN | Age: 70
Discharge: HOME | End: 2025-04-08
Attending: SURGERY
Payer: MEDICARE

## 2025-04-08 DIAGNOSIS — R13.10: ICD-10-CM

## 2025-04-08 DIAGNOSIS — R10.11: Primary | ICD-10-CM

## 2025-04-08 PROCEDURE — 78226 HEPATOBILIARY SYSTEM IMAGING: CPT

## 2025-04-25 ENCOUNTER — HOSPITAL ENCOUNTER (OUTPATIENT)
Dept: HOSPITAL 47 - LABWHC1 | Age: 70
Discharge: HOME | End: 2025-04-25
Attending: FAMILY MEDICINE
Payer: MEDICARE

## 2025-04-25 DIAGNOSIS — L65.0: ICD-10-CM

## 2025-04-25 DIAGNOSIS — E03.9: ICD-10-CM

## 2025-04-25 DIAGNOSIS — K64.9: ICD-10-CM

## 2025-04-25 DIAGNOSIS — I50.22: Primary | ICD-10-CM

## 2025-04-25 LAB
ALBUMIN SERPL-MCNC: 4.3 G/DL (ref 3.8–4.9)
ALBUMIN/GLOB SERPL: 2.26 RATIO (ref 1.6–3.17)
ALP SERPL-CCNC: 78 U/L (ref 41–126)
ALT SERPL-CCNC: 24 U/L (ref 8–44)
AST SERPL-CCNC: 31 U/L (ref 13–35)
BASOPHILS # BLD AUTO: 0.03 X 10*3/UL (ref 0–0.1)
BASOPHILS NFR BLD AUTO: 0.3 %
BUN SERPL-SCNC: 18.2 MG/DL (ref 9–27)
CALCIUM SPEC-MCNC: 9.3 MG/DL (ref 8.7–10.3)
CHLORIDE SERPL-SCNC: 106 MMOL/L (ref 96–109)
EOSINOPHIL # BLD AUTO: 0 X 10*3/UL (ref 0.04–0.35)
EOSINOPHIL NFR BLD AUTO: 0 %
ERYTHROCYTE [DISTWIDTH] IN BLOOD BY AUTOMATED COUNT: 4.82 X 10*6/UL (ref 4.1–5.2)
GLOBULIN SER CALC-MCNC: 1.9 G/DL (ref 1.6–3.3)
GLUCOSE SERPL-MCNC: 99 MG/DL (ref 70–110)
HCT VFR BLD AUTO: 44.4 % (ref 37.2–46.3)
HGB BLD-MCNC: 14.4 G/DL (ref 12–15)
LDLC SERPL CALC-MCNC: 80.6 MG/DL (ref 0–131)
LYMPHOCYTES # SPEC AUTO: 0.79 X 10*3/UL (ref 0.9–5)
MCH RBC QN AUTO: 29.9 PG (ref 27–32)
MCHC RBC AUTO-ENTMCNC: 32.4 G/DL (ref 32–37)
MCV RBC AUTO: 92.1 FL (ref 80–97)
MONOCYTES # BLD AUTO: 0.71 X 10*3/UL (ref 0.2–1)
MONOCYTES NFR BLD AUTO: 6.3 %
NEUTROPHILS NFR BLD AUTO: 86.1 %
NRBC BLD AUTO-RTO: 0 X 10*3/UL (ref 0–0.01)
PLATELET # BLD AUTO: 257 X 10*3/UL (ref 140–440)
POTASSIUM SERPL-SCNC: 4.4 MMOL/L (ref 3.5–5.5)
PROT SERPL-MCNC: 6.2 G/DL (ref 6.2–8.2)
SODIUM SERPL-SCNC: 142 MMOL/L (ref 135–145)
T4 FREE SERPL-MCNC: 0.99 NG/DL (ref 0.8–1.8)
VLDLC SERPL CALC-MCNC: 15.32 MG/DL (ref 5–40)
WBC # BLD AUTO: 11.26 X 10*3/UL (ref 4.5–10)

## 2025-04-25 PROCEDURE — 84439 ASSAY OF FREE THYROXINE: CPT

## 2025-04-25 PROCEDURE — 80061 LIPID PANEL: CPT

## 2025-04-25 PROCEDURE — 85025 COMPLETE CBC W/AUTO DIFF WBC: CPT

## 2025-04-25 PROCEDURE — 84443 ASSAY THYROID STIM HORMONE: CPT

## 2025-04-25 PROCEDURE — 83036 HEMOGLOBIN GLYCOSYLATED A1C: CPT

## 2025-04-25 PROCEDURE — 80053 COMPREHEN METABOLIC PANEL: CPT

## 2025-04-25 PROCEDURE — 84403 ASSAY OF TOTAL TESTOSTERONE: CPT

## 2025-04-25 PROCEDURE — 82626 DEHYDROEPIANDROSTERONE: CPT

## 2025-04-25 PROCEDURE — 36415 COLL VENOUS BLD VENIPUNCTURE: CPT

## 2025-06-20 ENCOUNTER — HOSPITAL ENCOUNTER (OUTPATIENT)
Dept: HOSPITAL 47 - RADUSWWP | Age: 70
Discharge: HOME | End: 2025-06-20
Attending: FAMILY MEDICINE
Payer: MEDICARE

## 2025-06-20 DIAGNOSIS — G45.9: Primary | ICD-10-CM

## 2025-06-20 PROCEDURE — 93880 EXTRACRANIAL BILAT STUDY: CPT
